# Patient Record
Sex: MALE | Race: WHITE | NOT HISPANIC OR LATINO | Employment: FULL TIME | URBAN - METROPOLITAN AREA
[De-identification: names, ages, dates, MRNs, and addresses within clinical notes are randomized per-mention and may not be internally consistent; named-entity substitution may affect disease eponyms.]

---

## 2024-02-14 ENCOUNTER — HOSPITAL ENCOUNTER (EMERGENCY)
Facility: HOSPITAL | Age: 29
Discharge: HOME | End: 2024-02-14
Attending: EMERGENCY MEDICINE | Admitting: EMERGENCY MEDICINE
Payer: COMMERCIAL

## 2024-02-14 VITALS
RESPIRATION RATE: 16 BRPM | BODY MASS INDEX: 33.08 KG/M2 | OXYGEN SATURATION: 98 % | WEIGHT: 218.26 LBS | HEART RATE: 58 BPM | TEMPERATURE: 97 F | DIASTOLIC BLOOD PRESSURE: 97 MMHG | SYSTOLIC BLOOD PRESSURE: 153 MMHG | HEIGHT: 68 IN

## 2024-02-14 DIAGNOSIS — R10.9 RIGHT SIDED ABDOMINAL PAIN: ICD-10-CM

## 2024-02-14 DIAGNOSIS — R10.9 RIGHT FLANK PAIN: Primary | ICD-10-CM

## 2024-02-14 LAB
ALBUMIN SERPL-MCNC: 4.4 G/DL (ref 3.5–5.2)
ALBUMIN/GLOB SERPL: 1.6 G/DL
ALP SERPL-CCNC: 84 U/L (ref 39–117)
ALT SERPL W P-5'-P-CCNC: 18 U/L (ref 1–41)
ANION GAP SERPL CALCULATED.3IONS-SCNC: 12.1 MMOL/L (ref 5–15)
AST SERPL-CCNC: 15 U/L (ref 1–40)
BASOPHILS # BLD AUTO: 0.05 10*3/MM3 (ref 0–0.2)
BASOPHILS NFR BLD AUTO: 0.5 % (ref 0–1.5)
BILIRUB SERPL-MCNC: 0.7 MG/DL (ref 0–1.2)
BILIRUB UR QL STRIP: NEGATIVE
BUN SERPL-MCNC: 11 MG/DL (ref 6–20)
BUN/CREAT SERPL: 12.4 (ref 7–25)
CALCIUM SPEC-SCNC: 9.1 MG/DL (ref 8.6–10.5)
CHLORIDE SERPL-SCNC: 103 MMOL/L (ref 98–107)
CLARITY UR: ABNORMAL
CO2 SERPL-SCNC: 24.9 MMOL/L (ref 22–29)
COLOR UR: YELLOW
CREAT SERPL-MCNC: 0.89 MG/DL (ref 0.76–1.27)
DEPRECATED RDW RBC AUTO: 40.5 FL (ref 37–54)
EGFRCR SERPLBLD CKD-EPI 2021: 119 ML/MIN/1.73
EOSINOPHIL # BLD AUTO: 0.09 10*3/MM3 (ref 0–0.4)
EOSINOPHIL NFR BLD AUTO: 0.9 % (ref 0.3–6.2)
ERYTHROCYTE [DISTWIDTH] IN BLOOD BY AUTOMATED COUNT: 12.9 % (ref 12.3–15.4)
GLOBULIN UR ELPH-MCNC: 2.7 GM/DL
GLUCOSE SERPL-MCNC: 91 MG/DL (ref 65–99)
GLUCOSE UR STRIP-MCNC: NEGATIVE MG/DL
HCT VFR BLD AUTO: 48.5 % (ref 37.5–51)
HGB BLD-MCNC: 16.1 G/DL (ref 13–17.7)
HGB UR QL STRIP.AUTO: NEGATIVE
HOLD SPECIMEN: NORMAL
HOLD SPECIMEN: NORMAL
IMM GRANULOCYTES # BLD AUTO: 0.04 10*3/MM3 (ref 0–0.05)
IMM GRANULOCYTES NFR BLD AUTO: 0.4 % (ref 0–0.5)
KETONES UR QL STRIP: NEGATIVE
LEUKOCYTE ESTERASE UR QL STRIP.AUTO: NEGATIVE
LIPASE SERPL-CCNC: 32 U/L (ref 13–60)
LYMPHOCYTES # BLD AUTO: 2.95 10*3/MM3 (ref 0.7–3.1)
LYMPHOCYTES NFR BLD AUTO: 28.9 % (ref 19.6–45.3)
MCH RBC QN AUTO: 29 PG (ref 26.6–33)
MCHC RBC AUTO-ENTMCNC: 33.2 G/DL (ref 31.5–35.7)
MCV RBC AUTO: 87.2 FL (ref 79–97)
MONOCYTES # BLD AUTO: 0.88 10*3/MM3 (ref 0.1–0.9)
MONOCYTES NFR BLD AUTO: 8.6 % (ref 5–12)
NEUTROPHILS NFR BLD AUTO: 6.19 10*3/MM3 (ref 1.7–7)
NEUTROPHILS NFR BLD AUTO: 60.7 % (ref 42.7–76)
NITRITE UR QL STRIP: NEGATIVE
NRBC BLD AUTO-RTO: 0 /100 WBC (ref 0–0.2)
PH UR STRIP.AUTO: 6.5 [PH] (ref 5–8)
PLATELET # BLD AUTO: 280 10*3/MM3 (ref 140–450)
PMV BLD AUTO: 9.7 FL (ref 6–12)
POTASSIUM SERPL-SCNC: 3.7 MMOL/L (ref 3.5–5.2)
PROT SERPL-MCNC: 7.1 G/DL (ref 6–8.5)
PROT UR QL STRIP: NEGATIVE
RBC # BLD AUTO: 5.56 10*6/MM3 (ref 4.14–5.8)
SODIUM SERPL-SCNC: 140 MMOL/L (ref 136–145)
SP GR UR STRIP: 1.02 (ref 1–1.03)
UROBILINOGEN UR QL STRIP: ABNORMAL
WBC NRBC COR # BLD AUTO: 10.2 10*3/MM3 (ref 3.4–10.8)
WHOLE BLOOD HOLD COAG: NORMAL
WHOLE BLOOD HOLD SPECIMEN: NORMAL

## 2024-02-14 PROCEDURE — 99283 EMERGENCY DEPT VISIT LOW MDM: CPT

## 2024-02-14 PROCEDURE — 36415 COLL VENOUS BLD VENIPUNCTURE: CPT

## 2024-02-14 PROCEDURE — 80053 COMPREHEN METABOLIC PANEL: CPT | Performed by: EMERGENCY MEDICINE

## 2024-02-14 PROCEDURE — 25010000002 KETOROLAC TROMETHAMINE PER 15 MG: Performed by: NURSE PRACTITIONER

## 2024-02-14 PROCEDURE — 96361 HYDRATE IV INFUSION ADD-ON: CPT

## 2024-02-14 PROCEDURE — 83690 ASSAY OF LIPASE: CPT | Performed by: EMERGENCY MEDICINE

## 2024-02-14 PROCEDURE — 25810000003 SODIUM CHLORIDE 0.9 % SOLUTION: Performed by: NURSE PRACTITIONER

## 2024-02-14 PROCEDURE — 36415 COLL VENOUS BLD VENIPUNCTURE: CPT | Performed by: EMERGENCY MEDICINE

## 2024-02-14 PROCEDURE — 85025 COMPLETE CBC W/AUTO DIFF WBC: CPT | Performed by: EMERGENCY MEDICINE

## 2024-02-14 PROCEDURE — 96374 THER/PROPH/DIAG INJ IV PUSH: CPT

## 2024-02-14 PROCEDURE — 81003 URINALYSIS AUTO W/O SCOPE: CPT | Performed by: EMERGENCY MEDICINE

## 2024-02-14 RX ORDER — SODIUM CHLORIDE 0.9 % (FLUSH) 0.9 %
10 SYRINGE (ML) INJECTION AS NEEDED
Status: DISCONTINUED | OUTPATIENT
Start: 2024-02-14 | End: 2024-02-15 | Stop reason: HOSPADM

## 2024-02-14 RX ORDER — IBUPROFEN 600 MG/1
600 TABLET ORAL EVERY 8 HOURS PRN
Qty: 20 TABLET | Refills: 0 | Status: SHIPPED | OUTPATIENT
Start: 2024-02-14

## 2024-02-14 RX ORDER — KETOROLAC TROMETHAMINE 15 MG/ML
15 INJECTION, SOLUTION INTRAMUSCULAR; INTRAVENOUS ONCE
Status: COMPLETED | OUTPATIENT
Start: 2024-02-14 | End: 2024-02-14

## 2024-02-14 RX ADMIN — KETOROLAC TROMETHAMINE 15 MG: 15 INJECTION, SOLUTION INTRAMUSCULAR; INTRAVENOUS at 21:36

## 2024-02-14 RX ADMIN — SODIUM CHLORIDE 1000 ML: 9 INJECTION, SOLUTION INTRAVENOUS at 21:38

## 2024-02-15 NOTE — ED PROVIDER NOTES
Pt presents to the ED c/o right-sided abdominal and flank pain for the last 1 to 2 days.  Described as an ache.  Was evaluated at [facility] emergency department earlier today, had normal labs and imaging studies and was discharged home.  Came in for ongoing symptoms.  No fevers, chills, nausea, vomiting, diarrhea, dysuria, hematuria.     On exam,   General: No acute distress, nontoxic  HEENT: Mucous membranes moist, atraumatic, EOMI  Neck: Full ROM  Pulm: Symmetric chest rise, nonlabored, lungs CTAB  Cardiovascular: Regular rate and rhythm, intact distal pulses  GI: Soft, minimal right mid upper abdominal discomfort to palpation, nondistended, no rebound, no guarding, bowel sounds present  MSK: Full ROM, no deformity  Skin: Warm, dry  Neuro: Awake, alert, oriented x 4, GCS 15, moving all extremities, no focal deficits  Psych: Calm, cooperative        Plan: Given recent normal workup earlier today, lower concern for acute emergent process of cholecystitis, appendicitis, obstructive uropathy, suspect probably more likely muscular in nature but considerations for super early infectious process or inflammatory process are considered.  Will recheck labs, will hold off on CT scan unless labs are abnormal.  Supportive care.    ED Course as of 02/15/24 0209   Wed Feb 14, 2024 2144 WBC: 10.20 [DC]   2144 Hemoglobin: 16.1 [DC]   2144 Glucose: 91 [DC]   2144 BUN: 11 [DC]   2144 Creatinine: 0.89 [DC]   2144 Sodium: 140 [DC]   2144 Potassium: 3.7 [DC]   2144 Lipase: 32 [DC]   2144 Nitrite, UA: Negative [DC]   2144 Leukocytes, UA: Negative [DC]   2144 Ketones, UA: Negative [DC]   2144 Blood, UA: Negative [DC]   2200 I discussed the case with Dr. Alba and they agree to evaluate the patient at the bedside.    [AR]   2218 The patient was reexamined.  They have had symptomatic improvement during their ED stay.  I discussed today's findings with the patient, explaining the pertinent positives and negatives from today's visit,  and the plan of care.  Discussed plan for discharge as there is no emergent indication for admission.  Discussed limitation of the ED work-up and that this is to rule out life-threatening emergencies but that they could require further testing as determined by their primary care and or any referred specialist patient is agreeable and understands need for follow-up and repeat exam/testing.  Patient is aware that discharge does not mean there is nothing wrong, indicates no emergency is present, and that they must continue their care with their primary care physician and/or any referred specialist.  They were given appropriate follow-up with their primary care physician and/or specialist.  I had an extensive discussion on the expected clinical course and return precautions.  Patient understands to return to the emergency department for continuation, worsening, or new symptoms.  I answered any of the patient's questions. Patient was discharged home in a stable condition.     [AR]      ED Course User Index  [AR] Chen Stark APRN  [DC] Raj Alba MD       Reassuring workup at this point, reassuring benign exam, safe for discharge with outpatient supportive care, ED return precautions given.  All questions and concerns addressed.     MD Attestation Note    SHARED VISIT: This visit was performed by BOTH a physician and an APC. The substantive portion of the medical decision making was performed by this attesting physician who made or approved the management plan and takes responsibility for patient management. All studies in the APC note (if performed) were independently interpreted by me.                   Raj Alba MD  02/15/24 0211

## 2024-02-15 NOTE — DISCHARGE INSTRUCTIONS
You have been given emergency department evaluation.  This evaluation is intended to rule out life-threatening conditions.  Is not a complete evaluation.  You could require further testing as determined by your primary care physician or any referred specialist.  Please follow-up with all doctors that you are referred to.  Rest.  Drink plenty of fluids, stay hydrated.  You can take Tylenol in addition to ibuprofen you were prescribed.  Please be sure to take your prescribed medications and follow any specific instructions in the discharge instructions.  Please follow-up with your primary care physician within 48 hours.  Please have your primary care provider recheck your blood pressure.  Please return to the emergency department if you experience chest pain, shortness of breath, abdominal pain, fever greater than 102, intractable vomiting.  Please return to the emergency department if your symptoms continue or worsen, or if you begin to experience any other concerning symptom.

## 2024-02-15 NOTE — ED PROVIDER NOTES
EMERGENCY DEPARTMENT ENCOUNTER  Room Number:  05/05  PCP: Provider, No Known  Independent Historians: Patient      HPI:  Chief Complaint: had concerns including Abdominal Pain (right lower pain radiating to lower back, right side only).     A complete HPI/ROS/PMH/PSH/SH/FH are unobtainable due to: None    Chronic or social conditions impacting patient care (Social Determinants of Health): None      Context: Syed Chowdhury is a 29 y.o. male who presents to the emergency department complaining of right-sided abdominal pain and right flank pain.  Symptoms started 1 to 2 days ago and have been constant.  Pain is described as an ache.  Patient is unaware of any alleviating or aggravating factors.  Patient reports he was evaluated at [facility] emergency department prior to arrival at this emergency department, states he was told his CAT scan was normal and to go home and take Tylenol and ibuprofen.  Patient denies fever, chills, headache, lightheadedness, dizziness, numbness, tingling, unilateral extremity weakness, syncope, shortness of breath, chest pain, nausea, vomiting, diarrhea, dysuria, hematuria, or other complaints.    Review of prior external notes (non-ED) -and- Review of prior external test results outside of this encounter:   Very 14 2024: [facility] emergency department visit.  Patient was evaluated for right-sided flank pain.  CT abdomen and pelvis unremarkable.      PAST MEDICAL HISTORY  Active Ambulatory Problems     Diagnosis Date Noted    No Active Ambulatory Problems     Resolved Ambulatory Problems     Diagnosis Date Noted    No Resolved Ambulatory Problems     No Additional Past Medical History         PAST SURGICAL HISTORY  No past surgical history on file.      FAMILY HISTORY  Family History   Problem Relation Age of Onset    No Known Problems Mother     No Known Problems Father          SOCIAL HISTORY  Social History     Socioeconomic History    Marital status: Single   Tobacco Use     Smoking status: Never    Smokeless tobacco: Never   Substance and Sexual Activity    Alcohol use: Yes     Comment: occ    Drug use: No    Sexual activity: Defer         ALLERGIES  Patient has no known allergies.      REVIEW OF SYSTEMS  Included in HPI  All systems reviewed and negative except for those discussed in HPI.      PHYSICAL EXAM    I have reviewed the triage vital signs and nursing notes.    ED Triage Vitals   Temp Heart Rate Resp BP SpO2   02/14/24 2041 02/14/24 2041 02/14/24 2041 02/14/24 2047 02/14/24 2041   97 °F (36.1 °C) 84 16 161/96 98 %      Temp src Heart Rate Source Patient Position BP Location FiO2 (%)   -- -- 02/14/24 2047 02/14/24 2047 --     Lying Left arm        Physical Exam  Vitals and nursing note reviewed.   Constitutional:       General: He is awake. He is not in acute distress.     Appearance: He is not toxic-appearing.   HENT:      Head: Normocephalic and atraumatic.   Eyes:      General: Lids are normal. Vision grossly intact.   Cardiovascular:      Rate and Rhythm: Normal rate and regular rhythm.      Heart sounds: Normal heart sounds.   Pulmonary:      Effort: Pulmonary effort is normal.      Breath sounds: Normal breath sounds and air entry.   Abdominal:      General: Bowel sounds are normal.      Palpations: Abdomen is soft.      Tenderness: There is no abdominal tenderness (mild). There is right CVA tenderness (mild ttp, pain is reproducible.).   Musculoskeletal:      Cervical back: Normal range of motion.   Skin:     General: Skin is warm and dry.      Coloration: Skin is not pale.   Neurological:      General: No focal deficit present.      Mental Status: He is alert and oriented to person, place, and time.      GCS: GCS eye subscore is 4. GCS verbal subscore is 5. GCS motor subscore is 6.   Psychiatric:         Attention and Perception: Attention normal.         Mood and Affect: Mood normal.         Speech: Speech normal.         Behavior: Behavior is cooperative.         LAB  RESULTS  Recent Results (from the past 24 hour(s))   LIPASE    Collection Time: 02/14/24  8:14 AM    Specimen: Fresh Frozen Plasma    Specimen type and source: Plasma, Blood   Result Value Ref Range    Lipase 14 0 - 59 U/L   CBC AND DIFFERENTIAL    Collection Time: 02/14/24  8:14 AM    Specimen type and source: Whole Blood, Blood   Result Value Ref Range    WBC 8.80 4.5 - 11.0 10*3/uL    RBC 5.75 4.5 - 5.9 10*6/uL    Hemoglobin 16.8 13.5 - 17.5 g/dL    Hematocrit 49.9 41.0 - 53.0 %    MCV 86.8 80.0 - 100.0 fL    MCH 29.2 26.0 - 34.0 pg    MCHC 33.7 31.0 - 37.0 g/dL    RDW 12.7 12.0 - 16.8 %    Platelets 250 140 - 440 10*3/uL    MPV 9.7 8.4 - 12.4 fL    Differential Type Hospital CBC w/AutoDiff (arb'U)    Neutrophil Rel % 66.1 45 - 80 %    Lymphocyte Rel % 25.1 15 - 50 %    Monocyte Rel % 6.8 0 - 15 %    Eosinophil % 0.6 0 - 7 %    Basophil Rel % 0.6 0 - 2 %    Immature Grans % 0.8 0.0 - 1.0 %    nRBC 0 0 /100(WBC)    Neutrophils Absolute 5.82 2.0 - 8.8 10*3/uL    Lymphocytes Absolute 2.21 0.7 - 5.5 10*3/uL    Monocytes Absolute 0.60 0.0 - 1.7 10*3/uL    Eosinophils Absolute 0.05 0.0 - 0.8 10*3/uL    Basophils Absolute 0.05 0.0 - 0.2 10*3/uL    Immature Grans, Absolute 0.07 0.00 - 0.10 10*3/uL   Comprehensive Metabolic Panel    Collection Time: 02/14/24  8:48 PM    Specimen: Blood   Result Value Ref Range    Glucose 91 65 - 99 mg/dL    BUN 11 6 - 20 mg/dL    Creatinine 0.89 0.76 - 1.27 mg/dL    Sodium 140 136 - 145 mmol/L    Potassium 3.7 3.5 - 5.2 mmol/L    Chloride 103 98 - 107 mmol/L    CO2 24.9 22.0 - 29.0 mmol/L    Calcium 9.1 8.6 - 10.5 mg/dL    Total Protein 7.1 6.0 - 8.5 g/dL    Albumin 4.4 3.5 - 5.2 g/dL    ALT (SGPT) 18 1 - 41 U/L    AST (SGOT) 15 1 - 40 U/L    Alkaline Phosphatase 84 39 - 117 U/L    Total Bilirubin 0.7 0.0 - 1.2 mg/dL    Globulin 2.7 gm/dL    A/G Ratio 1.6 g/dL    BUN/Creatinine Ratio 12.4 7.0 - 25.0    Anion Gap 12.1 5.0 - 15.0 mmol/L    eGFR 119.0 >60.0 mL/min/1.73   Lipase    Collection  Time: 02/14/24  8:48 PM    Specimen: Blood   Result Value Ref Range    Lipase 32 13 - 60 U/L   Green Top (Gel)    Collection Time: 02/14/24  8:48 PM   Result Value Ref Range    Extra Tube Hold for add-ons.    Lavender Top    Collection Time: 02/14/24  8:48 PM   Result Value Ref Range    Extra Tube hold for add-on    Gold Top - SST    Collection Time: 02/14/24  8:48 PM   Result Value Ref Range    Extra Tube Hold for add-ons.    Light Blue Top    Collection Time: 02/14/24  8:48 PM   Result Value Ref Range    Extra Tube Hold for add-ons.    CBC Auto Differential    Collection Time: 02/14/24  8:48 PM    Specimen: Blood   Result Value Ref Range    WBC 10.20 3.40 - 10.80 10*3/mm3    RBC 5.56 4.14 - 5.80 10*6/mm3    Hemoglobin 16.1 13.0 - 17.7 g/dL    Hematocrit 48.5 37.5 - 51.0 %    MCV 87.2 79.0 - 97.0 fL    MCH 29.0 26.6 - 33.0 pg    MCHC 33.2 31.5 - 35.7 g/dL    RDW 12.9 12.3 - 15.4 %    RDW-SD 40.5 37.0 - 54.0 fl    MPV 9.7 6.0 - 12.0 fL    Platelets 280 140 - 450 10*3/mm3    Neutrophil % 60.7 42.7 - 76.0 %    Lymphocyte % 28.9 19.6 - 45.3 %    Monocyte % 8.6 5.0 - 12.0 %    Eosinophil % 0.9 0.3 - 6.2 %    Basophil % 0.5 0.0 - 1.5 %    Immature Grans % 0.4 0.0 - 0.5 %    Neutrophils, Absolute 6.19 1.70 - 7.00 10*3/mm3    Lymphocytes, Absolute 2.95 0.70 - 3.10 10*3/mm3    Monocytes, Absolute 0.88 0.10 - 0.90 10*3/mm3    Eosinophils, Absolute 0.09 0.00 - 0.40 10*3/mm3    Basophils, Absolute 0.05 0.00 - 0.20 10*3/mm3    Immature Grans, Absolute 0.04 0.00 - 0.05 10*3/mm3    nRBC 0.0 0.0 - 0.2 /100 WBC   Urinalysis With Microscopic If Indicated (No Culture) - Urine, Clean Catch    Collection Time: 02/14/24  8:55 PM    Specimen: Urine, Clean Catch   Result Value Ref Range    Color, UA Yellow Yellow, Straw    Appearance, UA Turbid (A) Clear    pH, UA 6.5 5.0 - 8.0    Specific Gravity, UA 1.020 1.005 - 1.030    Glucose, UA Negative Negative    Ketones, UA Negative Negative    Bilirubin, UA Negative Negative    Blood, UA  Negative Negative    Protein, UA Negative Negative    Leuk Esterase, UA Negative Negative    Nitrite, UA Negative Negative    Urobilinogen, UA 2.0 E.U./dL (A) 0.2 - 1.0 E.U./dL         MEDICATIONS GIVEN IN ER  Medications   sodium chloride 0.9 % flush 10 mL (has no administration in time range)   sodium chloride 0.9 % bolus 1,000 mL (1,000 mL Intravenous New Bag 2/14/24 2138)   ketorolac (TORADOL) injection 15 mg (15 mg Intravenous Given 2/14/24 2136)           OUTPATIENT MEDICATION MANAGEMENT:  Current Facility-Administered Medications Ordered in Epic   Medication Dose Route Frequency Provider Last Rate Last Admin    sodium chloride 0.9 % bolus 1,000 mL  1,000 mL Intravenous Once Chen Stark APRN 1,000 mL/hr at 02/14/24 2138 1,000 mL at 02/14/24 2138    sodium chloride 0.9 % flush 10 mL  10 mL Intravenous PRN Raj Alba MD         Current Outpatient Medications Ordered in Epic   Medication Sig Dispense Refill    ibuprofen (ADVIL,MOTRIN) 600 MG tablet Take 1 tablet by mouth Every 8 (Eight) Hours As Needed for Mild Pain. 20 tablet 0             PROGRESS, DATA ANALYSIS, CONSULTS, AND MEDICAL DECISION MAKING  ORDERS PLACED DURING THIS VISIT:  Orders Placed This Encounter   Procedures    [place] Draw    Comprehensive Metabolic Panel    Lipase    Urinalysis With Microscopic If Indicated (No Culture) - Urine, Clean Catch    CBC Auto Differential    NPO Diet NPO Type: Strict NPO    Undress & Gown    Insert Peripheral IV    CBC & Differential    Green Top (Gel)    Lavender Top    Gold Top - SST    Light Blue Top       All labs have been independently interpreted by me.  All radiology studies have been reviewed by me. All EKG's have been independently viewed and interpreted by me.  Discussion below represents my analysis of pertinent findings related to patient's condition, differential diagnosis, treatment plan and final disposition.    Differential diagnosis includes but is not limited to:   Ureteral stone, acute  appendicitis, muscle strain, etc.    ED Course:  ED Course as of 02/14/24 2220 Wed Feb 14, 2024 2144 WBC: 10.20 [DC]   2144 Hemoglobin: 16.1 [DC]   2144 Glucose: 91 [DC]   2144 BUN: 11 [DC]   2144 Creatinine: 0.89 [DC]   2144 Sodium: 140 [DC]   2144 Potassium: 3.7 [DC]   2144 Lipase: 32 [DC]   2144 Nitrite, UA: Negative [DC]   2144 Leukocytes, UA: Negative [DC]   2144 Ketones, UA: Negative [DC]   2144 Blood, UA: Negative [DC]   2200 I discussed the case with Dr. Alba and they agree to evaluate the patient at the bedside.    [AR]   2218 The patient was reexamined.  They have had symptomatic improvement during their ED stay.  I discussed today's findings with the patient, explaining the pertinent positives and negatives from today's visit, and the plan of care.  Discussed plan for discharge as there is no emergent indication for admission.  Discussed limitation of the ED work-up and that this is to rule out life-threatening emergencies but that they could require further testing as determined by their primary care and or any referred specialist patient is agreeable and understands need for follow-up and repeat exam/testing.  Patient is aware that discharge does not mean there is nothing wrong, indicates no emergency is present, and that they must continue their care with their primary care physician and/or any referred specialist.  They were given appropriate follow-up with their primary care physician and/or specialist.  I had an extensive discussion on the expected clinical course and return precautions.  Patient understands to return to the emergency department for continuation, worsening, or new symptoms.  I answered any of the patient's questions. Patient was discharged home in a stable condition.     [AR]      ED Course User Index  [AR] Chen Stark APRN  [DC] Raj Abla MD           AS OF 22:24 EST VITALS:    BP - 153/97  HR - 58  TEMP - 97 °F (36.1 °C)  O2 SATS - 98%      MDM:  Patient is a overall  healthy 29-year-old male who presents to the emergency department complaining of right-sided abdominal pain and right flank pain.  Patient was evaluated at [facility] emergency department prior to this ED visit where he underwent labs and a CT scan of abdomen and pelvis.  I reviewed the previous encounter, CT scan abdomen pelvis was completely unremarkable.  Labs are unremarkable here, no tenderness in the right lower quadrant therefore unlikely appendicitis.  Urinalysis was without hematuria therefore likely not ureteral stone. Vital signs stable. Will treat patient symptomatically at home with ibuprofen.  Patient will be discharged home, he is agreeable.  Strict return precautions given.      COMPLEXITY OF CARE  Admission was considered but after careful review of the patient's presentation, physical examination, diagnostic results, and response to treatment the patient may be safely discharged with outpatient follow-up.        DIAGNOSIS  Final diagnoses:   Right flank pain   Right sided abdominal pain         DISPOSITION  ED Disposition       ED Disposition   Discharge    Condition   Stable    Comment   --                      Please note that portions of this document were completed with a voice recognition program.    Note Disclaimer: At [facility], we believe that sharing information builds trust and better relationships. You are receiving this note because you recently visited [facility]. It is possible you will see health information before a provider has talked with you about it. This kind of information can be easy to misunderstand. To help you fully understand what it means for your health, we urge you to discuss this note with your provider.       Chen Stark, LESLIE  02/15/24 0532

## 2024-02-16 ENCOUNTER — HOSPITAL ENCOUNTER (EMERGENCY)
Facility: HOSPITAL | Age: 29
Discharge: HOME | End: 2024-02-16
Attending: EMERGENCY MEDICINE | Admitting: EMERGENCY MEDICINE
Payer: COMMERCIAL

## 2024-02-16 ENCOUNTER — APPOINTMENT (OUTPATIENT)
Dept: CT IMAGING | Facility: HOSPITAL | Age: 29
End: 2024-02-16
Payer: COMMERCIAL

## 2024-02-16 VITALS
TEMPERATURE: 97.9 F | SYSTOLIC BLOOD PRESSURE: 156 MMHG | BODY MASS INDEX: 33.04 KG/M2 | OXYGEN SATURATION: 96 % | DIASTOLIC BLOOD PRESSURE: 101 MMHG | HEIGHT: 68 IN | RESPIRATION RATE: 18 BRPM | WEIGHT: 218 LBS | HEART RATE: 55 BPM

## 2024-02-16 DIAGNOSIS — N13.30 HYDRONEPHROSIS OF RIGHT KIDNEY: ICD-10-CM

## 2024-02-16 DIAGNOSIS — R10.9 ACUTE RIGHT FLANK PAIN: Primary | ICD-10-CM

## 2024-02-16 DIAGNOSIS — N20.0 KIDNEY STONE: ICD-10-CM

## 2024-02-16 LAB
ALBUMIN SERPL-MCNC: 4.7 G/DL (ref 3.5–5.2)
ALBUMIN/GLOB SERPL: 1.9 G/DL
ALP SERPL-CCNC: 82 U/L (ref 39–117)
ALT SERPL W P-5'-P-CCNC: 18 U/L (ref 1–41)
ANION GAP SERPL CALCULATED.3IONS-SCNC: 12 MMOL/L (ref 5–15)
AST SERPL-CCNC: 17 U/L (ref 1–40)
BASOPHILS # BLD AUTO: 0.04 10*3/MM3 (ref 0–0.2)
BASOPHILS NFR BLD AUTO: 0.4 % (ref 0–1.5)
BILIRUB SERPL-MCNC: 0.7 MG/DL (ref 0–1.2)
BILIRUB UR QL STRIP: NEGATIVE
BUN SERPL-MCNC: 13 MG/DL (ref 6–20)
BUN/CREAT SERPL: 14.3 (ref 7–25)
CALCIUM SPEC-SCNC: 9.5 MG/DL (ref 8.6–10.5)
CHLORIDE SERPL-SCNC: 103 MMOL/L (ref 98–107)
CK SERPL-CCNC: 77 U/L (ref 20–200)
CLARITY UR: ABNORMAL
CO2 SERPL-SCNC: 23 MMOL/L (ref 22–29)
COLOR UR: YELLOW
CREAT SERPL-MCNC: 0.91 MG/DL (ref 0.76–1.27)
DEPRECATED RDW RBC AUTO: 40.7 FL (ref 37–54)
EGFRCR SERPLBLD CKD-EPI 2021: 117 ML/MIN/1.73
EOSINOPHIL # BLD AUTO: 0.03 10*3/MM3 (ref 0–0.4)
EOSINOPHIL NFR BLD AUTO: 0.3 % (ref 0.3–6.2)
ERYTHROCYTE [DISTWIDTH] IN BLOOD BY AUTOMATED COUNT: 12.8 % (ref 12.3–15.4)
GLOBULIN UR ELPH-MCNC: 2.5 GM/DL
GLUCOSE SERPL-MCNC: 111 MG/DL (ref 65–99)
GLUCOSE UR STRIP-MCNC: NEGATIVE MG/DL
HCT VFR BLD AUTO: 49.3 % (ref 37.5–51)
HGB BLD-MCNC: 16.5 G/DL (ref 13–17.7)
HGB UR QL STRIP.AUTO: NEGATIVE
IMM GRANULOCYTES # BLD AUTO: 0.04 10*3/MM3 (ref 0–0.05)
IMM GRANULOCYTES NFR BLD AUTO: 0.4 % (ref 0–0.5)
KETONES UR QL STRIP: NEGATIVE
LEUKOCYTE ESTERASE UR QL STRIP.AUTO: NEGATIVE
LIPASE SERPL-CCNC: 21 U/L (ref 13–60)
LYMPHOCYTES # BLD AUTO: 1.83 10*3/MM3 (ref 0.7–3.1)
LYMPHOCYTES NFR BLD AUTO: 17.3 % (ref 19.6–45.3)
MCH RBC QN AUTO: 29.4 PG (ref 26.6–33)
MCHC RBC AUTO-ENTMCNC: 33.5 G/DL (ref 31.5–35.7)
MCV RBC AUTO: 87.9 FL (ref 79–97)
MONOCYTES # BLD AUTO: 0.72 10*3/MM3 (ref 0.1–0.9)
MONOCYTES NFR BLD AUTO: 6.8 % (ref 5–12)
NEUTROPHILS NFR BLD AUTO: 7.89 10*3/MM3 (ref 1.7–7)
NEUTROPHILS NFR BLD AUTO: 74.8 % (ref 42.7–76)
NITRITE UR QL STRIP: NEGATIVE
NRBC BLD AUTO-RTO: 0 /100 WBC (ref 0–0.2)
PH UR STRIP.AUTO: 7.5 [PH] (ref 5–8)
PLATELET # BLD AUTO: 256 10*3/MM3 (ref 140–450)
PMV BLD AUTO: 9.7 FL (ref 6–12)
POTASSIUM SERPL-SCNC: 4.2 MMOL/L (ref 3.5–5.2)
PROT SERPL-MCNC: 7.2 G/DL (ref 6–8.5)
PROT UR QL STRIP: NEGATIVE
RBC # BLD AUTO: 5.61 10*6/MM3 (ref 4.14–5.8)
SODIUM SERPL-SCNC: 138 MMOL/L (ref 136–145)
SP GR UR STRIP: 1.01 (ref 1–1.03)
UROBILINOGEN UR QL STRIP: ABNORMAL
WBC NRBC COR # BLD AUTO: 10.55 10*3/MM3 (ref 3.4–10.8)

## 2024-02-16 PROCEDURE — 83690 ASSAY OF LIPASE: CPT | Performed by: EMERGENCY MEDICINE

## 2024-02-16 PROCEDURE — 74177 CT ABD & PELVIS W/CONTRAST: CPT

## 2024-02-16 PROCEDURE — 80053 COMPREHEN METABOLIC PANEL: CPT | Performed by: EMERGENCY MEDICINE

## 2024-02-16 PROCEDURE — 81003 URINALYSIS AUTO W/O SCOPE: CPT | Performed by: EMERGENCY MEDICINE

## 2024-02-16 PROCEDURE — 25010000002 ONDANSETRON PER 1 MG: Performed by: EMERGENCY MEDICINE

## 2024-02-16 PROCEDURE — 25510000001 IOPAMIDOL 61 % SOLUTION: Performed by: EMERGENCY MEDICINE

## 2024-02-16 PROCEDURE — 25810000003 SODIUM CHLORIDE 0.9 % SOLUTION: Performed by: EMERGENCY MEDICINE

## 2024-02-16 PROCEDURE — 99285 EMERGENCY DEPT VISIT HI MDM: CPT

## 2024-02-16 PROCEDURE — 82550 ASSAY OF CK (CPK): CPT | Performed by: EMERGENCY MEDICINE

## 2024-02-16 PROCEDURE — 96374 THER/PROPH/DIAG INJ IV PUSH: CPT

## 2024-02-16 PROCEDURE — 85025 COMPLETE CBC W/AUTO DIFF WBC: CPT | Performed by: EMERGENCY MEDICINE

## 2024-02-16 PROCEDURE — 96375 TX/PRO/DX INJ NEW DRUG ADDON: CPT

## 2024-02-16 PROCEDURE — 25010000002 HYDROMORPHONE PER 4 MG: Performed by: EMERGENCY MEDICINE

## 2024-02-16 RX ORDER — ONDANSETRON 2 MG/ML
4 INJECTION INTRAMUSCULAR; INTRAVENOUS ONCE
Status: COMPLETED | OUTPATIENT
Start: 2024-02-16 | End: 2024-02-16

## 2024-02-16 RX ORDER — ONDANSETRON 4 MG/1
4 TABLET, ORALLY DISINTEGRATING ORAL 4 TIMES DAILY PRN
Qty: 12 TABLET | Refills: 0 | Status: SHIPPED | OUTPATIENT
Start: 2024-02-16

## 2024-02-16 RX ORDER — SODIUM CHLORIDE 0.9 % (FLUSH) 0.9 %
10 SYRINGE (ML) INJECTION AS NEEDED
Status: DISCONTINUED | OUTPATIENT
Start: 2024-02-16 | End: 2024-02-16 | Stop reason: HOSPADM

## 2024-02-16 RX ORDER — TAMSULOSIN HYDROCHLORIDE 0.4 MG/1
1 CAPSULE ORAL DAILY
Qty: 5 CAPSULE | Refills: 0 | Status: SHIPPED | OUTPATIENT
Start: 2024-02-16

## 2024-02-16 RX ORDER — HYDROCODONE BITARTRATE AND ACETAMINOPHEN 5; 325 MG/1; MG/1
1 TABLET ORAL EVERY 6 HOURS PRN
Qty: 12 TABLET | Refills: 0 | Status: SHIPPED | OUTPATIENT
Start: 2024-02-16

## 2024-02-16 RX ORDER — HYDROMORPHONE HYDROCHLORIDE 1 MG/ML
0.5 INJECTION, SOLUTION INTRAMUSCULAR; INTRAVENOUS; SUBCUTANEOUS ONCE
Status: COMPLETED | OUTPATIENT
Start: 2024-02-16 | End: 2024-02-16

## 2024-02-16 RX ADMIN — HYDROMORPHONE HYDROCHLORIDE 0.5 MG: 1 INJECTION, SOLUTION INTRAMUSCULAR; INTRAVENOUS; SUBCUTANEOUS at 03:16

## 2024-02-16 RX ADMIN — IOPAMIDOL 85 ML: 612 INJECTION, SOLUTION INTRAVENOUS at 03:28

## 2024-02-16 RX ADMIN — SODIUM CHLORIDE 1000 ML: 9 INJECTION, SOLUTION INTRAVENOUS at 03:16

## 2024-02-16 RX ADMIN — ONDANSETRON 4 MG: 2 INJECTION INTRAMUSCULAR; INTRAVENOUS at 03:16

## 2024-02-16 NOTE — ED PROVIDER NOTES
EMERGENCY DEPARTMENT ENCOUNTER  Room Number:  12/12  PCP: Provider, No Known  Independent Historians: Patient and Family      HPI:  Chief Complaint: had concerns including Back Pain (Back pain and nausea).     A complete HPI/ROS/PMH/PSH/SH/FH are unobtainable due to: None    Chronic or social conditions impacting patient care (Social Determinants of Health): None      Context: Syed Chowdhury is a 29 y.o. male with a medical history of no past medical history who presents to the ED c/o acute right flank pain.  The patient reports that he has had right flank pain for the last day today and a half.  He reports it has been severe.  He reports that he has been to 2 different emergency rooms for this pain.  He states that he has had a CAT scan and labs.  He states he was discharged on ibuprofen and muscle relaxers.  He reports the pain has gotten worse.  He reports it is all in his right flank.  He reports developing nausea and vomiting just prior to arrival.  He states he has been taking his medicine without relief.  He denies any prior abdominal surgeries.  He denies any midline back pain.      Review of prior external notes (non-ED) -and- Review of prior external test results outside of this encounter:  CT of the abdomen pelvis without contrast on 2/14/2024 showed no acute process.  He did have a cyst in his right kidney.    Prescription drug monitoring program review: YOBANI reviewed by Bg Paulino MD KASPER query complete and reviewed. Treatment plan to include limited course of prescribed controlled substance. Risks including addiction, benefits, and alternatives presented to patient.    PAST MEDICAL HISTORY  Active Ambulatory Problems     Diagnosis Date Noted    No Active Ambulatory Problems     Resolved Ambulatory Problems     Diagnosis Date Noted    No Resolved Ambulatory Problems     No Additional Past Medical History         PAST SURGICAL HISTORY  No past surgical history on file.      FAMILY  HISTORY  Family History   Problem Relation Age of Onset    No Known Problems Mother     No Known Problems Father          SOCIAL HISTORY  Social History     Socioeconomic History    Marital status:    Tobacco Use    Smoking status: Never    Smokeless tobacco: Never   Substance and Sexual Activity    Alcohol use: Yes     Comment: occ    Drug use: No    Sexual activity: Defer         ALLERGIES  Patient has no known allergies.      REVIEW OF SYSTEMS  Review of Systems  Included in HPI  All systems reviewed and negative except for those discussed in HPI.      PHYSICAL EXAM    I have reviewed the triage vital signs and nursing notes.    ED Triage Vitals [02/16/24 0240]   Temp Heart Rate Resp BP SpO2   97.9 °F (36.6 °C) 70 18 -- 99 %      Temp src Heart Rate Source Patient Position BP Location FiO2 (%)   Tympanic Monitor -- -- --       Physical Exam  GENERAL: Awake, alert, appears uncomfortable  SKIN: Warm, dry  HENT: Normocephalic, atraumatic  EYES: no scleral icterus  CV: regular rhythm, regular rate  RESPIRATORY: normal effort, lungs clear  ABDOMEN: soft, nontender, nondistended.  Right CVA and right flank tenderness.  No midline thoracic or lumbar tenderness.  MUSCULOSKELETAL: no deformity  NEURO: alert, moves all extremities, follows commands            LAB RESULTS  Recent Results (from the past 24 hour(s))   Comprehensive Metabolic Panel    Collection Time: 02/16/24  3:15 AM    Specimen: Blood   Result Value Ref Range    Glucose 111 (H) 65 - 99 mg/dL    BUN 13 6 - 20 mg/dL    Creatinine 0.91 0.76 - 1.27 mg/dL    Sodium 138 136 - 145 mmol/L    Potassium 4.2 3.5 - 5.2 mmol/L    Chloride 103 98 - 107 mmol/L    CO2 23.0 22.0 - 29.0 mmol/L    Calcium 9.5 8.6 - 10.5 mg/dL    Total Protein 7.2 6.0 - 8.5 g/dL    Albumin 4.7 3.5 - 5.2 g/dL    ALT (SGPT) 18 1 - 41 U/L    AST (SGOT) 17 1 - 40 U/L    Alkaline Phosphatase 82 39 - 117 U/L    Total Bilirubin 0.7 0.0 - 1.2 mg/dL    Globulin 2.5 gm/dL    A/G Ratio 1.9 g/dL     BUN/Creatinine Ratio 14.3 7.0 - 25.0    Anion Gap 12.0 5.0 - 15.0 mmol/L    eGFR 117.0 >60.0 mL/min/1.73   Urinalysis With Microscopic If Indicated (No Culture) - Urine, Clean Catch    Collection Time: 02/16/24  3:15 AM    Specimen: Urine, Clean Catch   Result Value Ref Range    Color, UA Yellow Yellow, Straw    Appearance, UA Cloudy (A) Clear    pH, UA 7.5 5.0 - 8.0    Specific Gravity, UA 1.014 1.005 - 1.030    Glucose, UA Negative Negative    Ketones, UA Negative Negative    Bilirubin, UA Negative Negative    Blood, UA Negative Negative    Protein, UA Negative Negative    Leuk Esterase, UA Negative Negative    Nitrite, UA Negative Negative    Urobilinogen, UA 1.0 E.U./dL 0.2 - 1.0 E.U./dL   CBC Auto Differential    Collection Time: 02/16/24  3:15 AM    Specimen: Blood   Result Value Ref Range    WBC 10.55 3.40 - 10.80 10*3/mm3    RBC 5.61 4.14 - 5.80 10*6/mm3    Hemoglobin 16.5 13.0 - 17.7 g/dL    Hematocrit 49.3 37.5 - 51.0 %    MCV 87.9 79.0 - 97.0 fL    MCH 29.4 26.6 - 33.0 pg    MCHC 33.5 31.5 - 35.7 g/dL    RDW 12.8 12.3 - 15.4 %    RDW-SD 40.7 37.0 - 54.0 fl    MPV 9.7 6.0 - 12.0 fL    Platelets 256 140 - 450 10*3/mm3    Neutrophil % 74.8 42.7 - 76.0 %    Lymphocyte % 17.3 (L) 19.6 - 45.3 %    Monocyte % 6.8 5.0 - 12.0 %    Eosinophil % 0.3 0.3 - 6.2 %    Basophil % 0.4 0.0 - 1.5 %    Immature Grans % 0.4 0.0 - 0.5 %    Neutrophils, Absolute 7.89 (H) 1.70 - 7.00 10*3/mm3    Lymphocytes, Absolute 1.83 0.70 - 3.10 10*3/mm3    Monocytes, Absolute 0.72 0.10 - 0.90 10*3/mm3    Eosinophils, Absolute 0.03 0.00 - 0.40 10*3/mm3    Basophils, Absolute 0.04 0.00 - 0.20 10*3/mm3    Immature Grans, Absolute 0.04 0.00 - 0.05 10*3/mm3    nRBC 0.0 0.0 - 0.2 /100 WBC   Lipase    Collection Time: 02/16/24  3:15 AM    Specimen: Blood   Result Value Ref Range    Lipase 21 13 - 60 U/L   CK    Collection Time: 02/16/24  3:15 AM    Specimen: Blood   Result Value Ref Range    Creatine Kinase 77 20 - 200 U/L         RADIOLOGY  CT  Abdomen Pelvis With Contrast    Result Date: 2/16/2024  Patient: ANNA MARIE MARIE  Time Out: 05:42 Exam(s): CT ABDOMEN + PELVIS With Contrast EXAM:   CT Abdomen and Pelvis With Intravenous Contrast CLINICAL HISTORY:    Reason for exam: acute right flank pain. TECHNIQUE:   Axial computed tomography images of the abdomen and pelvis with intravenous contrast.  CTDI is 13.33 mGy and DLP is 773.6 mGy-cm.  This CT exam was performed according to the principle of ALARA (As Low As Reasonably Achievable) by using one or more of the following dose reduction techniques: automated exposure control, adjustment of the mA and or kV according to patient size, and or use of iterative reconstruction technique. COMPARISON:   No relevant prior studies available. FINDINGS:   Lung bases:  Unremarkable.  No mass.  No consolidation.  ABDOMEN:   Liver:  Unremarkable.  No mass.   Gallbladder and bile ducts:  Unremarkable.  No calcified stones.  No ductal dilation.   Pancreas:  Unremarkable.  No mass.  No ductal dilation.   Spleen:  Unremarkable.  No splenomegaly.   Adrenals:  Unremarkable.  No mass.   Kidneys and ureters:  There is borderline dilatation of the right renal collecting system and right renal pelvis which could represent minimal hydronephrosis.  The right ureter is otherwise unremarkable.  There is no radiopaque obstructing ureteral stone.  There are relatively well- circumscribed hypodense nodules in the right kidney which most likely represent renal cysts.  The larger is at the upper pole, measures 1.3 cm in diameter with Hounsfield units of 15.  These most likely represent renal cysts and can be further assessed with a nonemergent ultrasound as clinically warranted. The left kidney and ureter are unremarkable.   Stomach and bowel:  Unremarkable.  No obstruction.  No mucosal thickening.  PELVIS:   Appendix:  Normal appendix.   Bladder:  There is borderline wall thickening of the urinary bladder, possibly due to a urinary  tract infection.    Reproductive:  There is a punctate calcification at the base of the penis-question if this represents a punctate stone in the penile urethra- series 4, image 111, series 2, image 170 and series 3, image 81.  ABDOMEN and PELVIS:   Intraperitoneal space:  Unremarkable.  No free air.  No significant fluid collection.   Bones joints:  No acute fracture.  No dislocation.   Soft tissues:  Unremarkable.   Vasculature:  Unremarkable.  No abdominal aortic aneurysm.   Lymph nodes:  Unremarkable.  No enlarged lymph nodes. IMPRESSION:     1.  Borderline dilatation of the right renal collecting system suggestive of minimal hydronephrosis.  There is a punctate stone which could be within the proximal segment of the penile urethra as described.  No other evidence of a radiopaque urinary tract stone. 2.  Borderline bladder wall thickening-correlate clinically for any possibility of a urinary tract infection. 3.  Normal appendix.     Electronically signed by Shea Fuentes MD on 02-16-24 at 0542       MEDICATIONS GIVEN IN ER  Medications   sodium chloride 0.9 % flush 10 mL (has no administration in time range)   HYDROmorphone (DILAUDID) injection 0.5 mg (0.5 mg Intravenous Given 2/16/24 0316)   sodium chloride 0.9 % bolus 1,000 mL (1,000 mL Intravenous New Bag 2/16/24 0316)   ondansetron (ZOFRAN) injection 4 mg (4 mg Intravenous Given 2/16/24 0316)   iopamidol (ISOVUE-300) 61 % injection 100 mL (85 mL Intravenous Given 2/16/24 0328)         ORDERS PLACED DURING THIS VISIT:  Orders Placed This Encounter   Procedures    CT Abdomen Pelvis With Contrast    Comprehensive Metabolic Panel    Urinalysis With Microscopic If Indicated (No Culture) - Urine, Clean Catch    CBC Auto Differential    Lipase    CK    Insert Peripheral IV    CBC & Differential         OUTPATIENT MEDICATION MANAGEMENT:  Current Facility-Administered Medications Ordered in Epic   Medication Dose Route Frequency Provider Last Rate Last Admin    sodium  chloride 0.9 % flush 10 mL  10 mL Intravenous PRN Bg Paulino MD         Current Outpatient Medications Ordered in Epic   Medication Sig Dispense Refill    HYDROcodone-acetaminophen (NORCO) 5-325 MG per tablet Take 1 tablet by mouth Every 6 (Six) Hours As Needed for Severe Pain. 12 tablet 0    ibuprofen (ADVIL,MOTRIN) 600 MG tablet Take 1 tablet by mouth Every 8 (Eight) Hours As Needed for Mild Pain. 20 tablet 0    ondansetron ODT (ZOFRAN-ODT) 4 MG disintegrating tablet Place 1 tablet on the tongue 4 (Four) Times a Day As Needed for Nausea. 12 tablet 0    tamsulosin (FLOMAX) 0.4 MG capsule 24 hr capsule Take 1 capsule by mouth Daily. 5 capsule 0         PROCEDURES  Procedures            PROGRESS, DATA ANALYSIS, CONSULTS, AND MEDICAL DECISION MAKING  All labs have been independently interpreted by me.  All radiology studies have been reviewed by me. All EKG's have been independently viewed and interpreted by me.  Discussion below represents my analysis of pertinent findings related to patient's condition, differential diagnosis, treatment plan and final disposition.    Differential diagnosis includes but is not limited to UTI, kidney stone, muscle strain, rhabdomyolysis, shingles, appendicitis, pyelonephritis.    Clinical Scores:                   ED Course as of 02/16/24 0553   Fri Feb 16, 2024   0329 CT Abdomen Pelvis With Contrast  My independent interpretation of the imaging study is right renal cyst [TR]   0415 BP(!): 156/101 [TR]   0420 Evaluation is unremarkable.  He has normal urinalysis.  He has normal chemistries blood counts.  He has normal CK which rules out rhabdomyolysis. [TR]   0550 I reviewed the workup and findings with the patient and family at the bedside.  Answered all questions.  The patient reports he is comfortable.  He does have a small kidney stone seen in the base of the penis and some mild hydronephrosis on the right.  Plan discharge home with Flomax, pain medicine, antiemetics and  follow-up with urology.  The patient and family are agreeable. [TR]      ED Course User Index  [TR] Bg Paulino MD             AS OF 05:53 EST VITALS:    BP - (!) 156/101  HR - 59  TEMP - 97.9 °F (36.6 °C) (Tympanic)  O2 SATS - 93%    COMPLEXITY OF CARE  Admission was considered but after careful review of the patient's presentation, physical examination, diagnostic results, and response to treatment the patient may be safely discharged with outpatient follow-up.      DIAGNOSIS  Final diagnoses:   Acute right flank pain   Hydronephrosis of right kidney   Kidney stone         DISPOSITION  ED Disposition       ED Disposition   Discharge    Condition   Stable    Comment   --                Please note that portions of this document were completed with a voice recognition program.    Note Disclaimer: At [facility], we believe that sharing information builds trust and better relationships. You are receiving this note because you recently visited [facility]. It is possible you will see health information before a provider has talked with you about it. This kind of information can be easy to misunderstand. To help you fully understand what it means for your health, we urge you to discuss this note with your provider.         Bg Paulino MD  02/16/24 0552       Bg Paulino MD  02/16/24 0553

## 2024-02-16 NOTE — DISCHARGE INSTRUCTIONS
Take pain medication as prescribed.  Use nausea medicine as needed for nausea.  Call the urologist office for an appointment to be seen.  Return immediately for any uncontrolled pain, uncontrolled nausea or vomiting, or fever of 100.4.

## 2024-02-16 NOTE — ED NOTES
Pt arrived to ER via pv. C/o of lower back pain. Was seen yesterday at ER, was advised if he became nauseated to come back.  Pt stated his nausea stated at 2300

## 2024-04-02 ENCOUNTER — OFFICE VISIT (OUTPATIENT)
Dept: FAMILY MEDICINE CLINIC | Facility: CLINIC | Age: 29
End: 2024-04-02
Payer: COMMERCIAL

## 2024-04-02 VITALS
OXYGEN SATURATION: 97 % | SYSTOLIC BLOOD PRESSURE: 110 MMHG | BODY MASS INDEX: 34.22 KG/M2 | TEMPERATURE: 97.8 F | HEART RATE: 74 BPM | HEIGHT: 68 IN | DIASTOLIC BLOOD PRESSURE: 70 MMHG | WEIGHT: 225.8 LBS

## 2024-04-02 DIAGNOSIS — Z00.00 ENCOUNTER FOR PREVENTIVE HEALTH EXAMINATION: ICD-10-CM

## 2024-04-02 DIAGNOSIS — M89.8X8 STERNAL MASS: Chronic | ICD-10-CM

## 2024-04-02 DIAGNOSIS — Z77.22 TOBACCO SMOKE EXPOSURE: ICD-10-CM

## 2024-04-02 DIAGNOSIS — Z76.89 ENCOUNTER TO ESTABLISH CARE: Primary | ICD-10-CM

## 2024-04-02 DIAGNOSIS — Z87.442 HISTORY OF RENAL STONE: ICD-10-CM

## 2024-04-02 DIAGNOSIS — Z13.220 LIPID SCREENING: ICD-10-CM

## 2024-04-02 DIAGNOSIS — Z11.59 NEED FOR HEPATITIS C SCREENING TEST: ICD-10-CM

## 2024-04-02 PROCEDURE — 93000 ELECTROCARDIOGRAM COMPLETE: CPT | Performed by: STUDENT IN AN ORGANIZED HEALTH CARE EDUCATION/TRAINING PROGRAM

## 2024-04-02 PROCEDURE — 99203 OFFICE O/P NEW LOW 30 MIN: CPT | Performed by: STUDENT IN AN ORGANIZED HEALTH CARE EDUCATION/TRAINING PROGRAM

## 2024-04-02 NOTE — PROGRESS NOTES
"Chief Complaint  Establish Care (Pt has bump on chest noticed 2 yrs ago)    Subjective        Syed Chowdhury presents to [facility]  History of Present Illness  28yo WM Patient was previously NOT seen by PCP at [facility], AND patient is new to me and is here for establishment of care visit for chronic medical conditions including h/o renal stone.    Pt has h/o renal stone and states he went to ER x3 and subsequently went to [facility] and Rx flomax and renal stone passed and pt was discharged by [facility]. Pt denied any current renal stone sx states its resolved.    Pt c/o skin lump on his sternum present for 2 years and now getting bigger.  Pt denied any cancers in first degree relatives.    Denied TOB use.    Patient denied any Chest pain, Shortness of Air, palpitations, Dizziness, Headache, Blurred vision, or weakness/numbness.   Pt reports h/o 2nd hand tob exposure to parental TOB smoking as a child.  Pt does reports he wheezes occasionally after exercising.      Objective   Vital Signs:  /70 (BP Location: Left arm, Patient Position: Sitting, Cuff Size: Adult)   Pulse 74   Temp 97.8 °F (36.6 °C) (Temporal)   Ht 172.7 cm (67.99\")   Wt 102 kg (225 lb 12.8 oz)   SpO2 97%   BMI 34.34 kg/m²   Estimated body mass index is 34.34 kg/m² as calculated from the following:    Height as of this encounter: 172.7 cm (67.99\").    Weight as of this encounter: 102 kg (225 lb 12.8 oz).       BMI is >= 30 and <35. (Class 1 Obesity). The following options were offered after discussion;: exercise counseling/recommendations and nutrition counseling/recommendations      Physical Exam  Constitutional:       Appearance: Normal appearance.   HENT:      Head: Normocephalic and atraumatic.   Eyes:      Conjunctiva/sclera: Conjunctivae normal.   Cardiovascular:      Rate and Rhythm: Normal rate and regular rhythm.      Heart sounds: Normal heart sounds. "   Pulmonary:      Effort: Pulmonary effort is normal.      Breath sounds: Normal breath sounds.   Abdominal:      General: Bowel sounds are normal.      Palpations: Abdomen is soft.      Comments: Non-tender   Skin:     General: Skin is warm.      Comments: Single Lanse size lipoma like mass on right lateral lower-third of sternum.  Mass is movable and semi-solid on palpation.    No other chest masses or lesions noted on exam.       Neurological:      General: No focal deficit present.      Mental Status: He is alert and oriented to person, place, and time.   Psychiatric:         Mood and Affect: Mood normal.         Behavior: Behavior normal.        Result Review :    The following data was reviewed by: LESLIE Oates on 04/02/2024:  Common labs          2/14/2024    08:14 2/14/2024    20:48 2/16/2024    03:15   Common Labs   Glucose  91  111    BUN  11  13    Creatinine  0.89  0.91    Sodium  140  138    Potassium  3.7  4.2    Chloride  103  103    Calcium  9.1  9.5    Albumin  4.4  4.7    Total Bilirubin  0.7  0.7    Alkaline Phosphatase  84  82    AST (SGOT)  15  17    ALT (SGPT)  18  18    WBC 8.80     10.20  10.55    Hemoglobin 16.8     16.1  16.5    Hematocrit 49.9     48.5  49.3    Platelets 250     280  256       Details          This result is from an external source.                     ECG 12 Lead    Date/Time: 4/2/2024 9:51 AM  Performed by: Mert Harp APRN    Authorized by: Mert Harp APRN  Previous ECG: no previous ECG available  Rhythm: sinus rhythm  Rate: normal  QRS axis: normal    Clinical impression: normal ECG            Assessment and Plan     Diagnoses and all orders for this visit:    1. Encounter to establish care (Primary)    2. Sternal mass  -     XR Chest PA & Lateral; Future  -     US chest; Future  -     ECG 12 Lead    3. History of renal stone  Assessment & Plan:  Encourage adequate fluid intake at least 2 L of water per day.    Orders:  -      Comprehensive Metabolic Panel  -     Urinalysis With Microscopic - Urine, Clean Catch    4. Tobacco smoke exposure  Assessment & Plan:  Patient reports occasional post-exercise wheezing     Orders:  -     Spirometry - Pre & Post Bronchodilator; Future    5. Encounter for preventive health examination  Assessment & Plan:  Discussed the importance of healthy diet, nutrition, and lifestyle. Recommend low salt, fat/cholesterol diet and avoid concentrated sweets. Encouraged DASH diet along with fresh fruits & vegetables and low fat dairy products. Counseled patient to exercise/walk as tolerated. Avoid tobacco and alcohol use.      Orders:  -     CBC & Differential  -     Comprehensive Metabolic Panel    6. Lipid screening  -     Lipid Panel    7. Need for hepatitis C screening test  Assessment & Plan:  Patient consented to hepatitis lab test today after discussion.      Orders:  -     Hepatitis C Antibody      All chronic conditions have been addressed and treated by the practice or other specialists. Medications have been reconciled and refilled as appropriate. Reiterated compliance and timely follow up appointments. Side effects of all new and old medications reviewed with the patient and patient willing to accept all risks involved. Advised RTO if no improvement or worsening of symptoms or if any new complaints arise. Patient advised to follow up with clinic or call after diagnostic tests, if patient does not hear from office 3 days after the test completion.            Follow Up     Return in about 4 weeks (around 4/30/2024) for Next scheduled follow up.  Patient was given instructions and counseling regarding his condition or for health maintenance advice. Please see specific information pulled into the AVS if appropriate.

## 2024-04-03 LAB
ALBUMIN SERPL-MCNC: 4.8 G/DL (ref 3.5–5.2)
ALBUMIN/GLOB SERPL: 1.9 G/DL
ALP SERPL-CCNC: 94 U/L (ref 39–117)
ALT SERPL-CCNC: 23 U/L (ref 1–41)
APPEARANCE UR: CLEAR
AST SERPL-CCNC: 19 U/L (ref 1–40)
BACTERIA #/AREA URNS HPF: NORMAL /HPF
BASOPHILS # BLD AUTO: 0.07 10*3/MM3 (ref 0–0.2)
BASOPHILS NFR BLD AUTO: 0.9 % (ref 0–1.5)
BILIRUB SERPL-MCNC: 0.7 MG/DL (ref 0–1.2)
BILIRUB UR QL STRIP: NEGATIVE
BUN SERPL-MCNC: 13 MG/DL (ref 6–20)
BUN/CREAT SERPL: 13 (ref 7–25)
CALCIUM SERPL-MCNC: 9.8 MG/DL (ref 8.6–10.5)
CASTS URNS MICRO: NORMAL
CHLORIDE SERPL-SCNC: 103 MMOL/L (ref 98–107)
CHOLEST SERPL-MCNC: 210 MG/DL (ref 0–200)
CO2 SERPL-SCNC: 24.9 MMOL/L (ref 22–29)
COLOR UR: YELLOW
CREAT SERPL-MCNC: 1 MG/DL (ref 0.76–1.27)
EGFRCR SERPLBLD CKD-EPI 2021: 104.5 ML/MIN/1.73
EOSINOPHIL # BLD AUTO: 0.15 10*3/MM3 (ref 0–0.4)
EOSINOPHIL NFR BLD AUTO: 2 % (ref 0.3–6.2)
EPI CELLS #/AREA URNS HPF: NORMAL /HPF
ERYTHROCYTE [DISTWIDTH] IN BLOOD BY AUTOMATED COUNT: 12.8 % (ref 12.3–15.4)
GLOBULIN SER CALC-MCNC: 2.5 GM/DL
GLUCOSE SERPL-MCNC: 95 MG/DL (ref 65–99)
GLUCOSE UR QL STRIP: NEGATIVE
HCT VFR BLD AUTO: 47.9 % (ref 37.5–51)
HCV IGG SERPL QL IA: NON REACTIVE
HDLC SERPL-MCNC: 63 MG/DL (ref 40–60)
HGB BLD-MCNC: 16.5 G/DL (ref 13–17.7)
HGB UR QL STRIP: NEGATIVE
IMM GRANULOCYTES # BLD AUTO: 0.1 10*3/MM3 (ref 0–0.05)
IMM GRANULOCYTES NFR BLD AUTO: 1.3 % (ref 0–0.5)
KETONES UR QL STRIP: NEGATIVE
LDLC SERPL CALC-MCNC: 135 MG/DL (ref 0–100)
LEUKOCYTE ESTERASE UR QL STRIP: NEGATIVE
LYMPHOCYTES # BLD AUTO: 2.6 10*3/MM3 (ref 0.7–3.1)
LYMPHOCYTES NFR BLD AUTO: 33.8 % (ref 19.6–45.3)
MCH RBC QN AUTO: 29.9 PG (ref 26.6–33)
MCHC RBC AUTO-ENTMCNC: 34.4 G/DL (ref 31.5–35.7)
MCV RBC AUTO: 86.8 FL (ref 79–97)
MONOCYTES # BLD AUTO: 0.56 10*3/MM3 (ref 0.1–0.9)
MONOCYTES NFR BLD AUTO: 7.3 % (ref 5–12)
NEUTROPHILS # BLD AUTO: 4.21 10*3/MM3 (ref 1.7–7)
NEUTROPHILS NFR BLD AUTO: 54.7 % (ref 42.7–76)
NITRITE UR QL STRIP: NEGATIVE
NRBC BLD AUTO-RTO: 0 /100 WBC (ref 0–0.2)
PH UR STRIP: 7.5 [PH] (ref 5–8)
PLATELET # BLD AUTO: 281 10*3/MM3 (ref 140–450)
POTASSIUM SERPL-SCNC: 4.7 MMOL/L (ref 3.5–5.2)
PROT SERPL-MCNC: 7.3 G/DL (ref 6–8.5)
PROT UR QL STRIP: NEGATIVE
RBC # BLD AUTO: 5.52 10*6/MM3 (ref 4.14–5.8)
RBC #/AREA URNS HPF: NORMAL /HPF
SODIUM SERPL-SCNC: 141 MMOL/L (ref 136–145)
SP GR UR STRIP: 1.02 (ref 1–1.03)
TRIGL SERPL-MCNC: 70 MG/DL (ref 0–150)
UROBILINOGEN UR STRIP-MCNC: NORMAL MG/DL
VLDLC SERPL CALC-MCNC: 12 MG/DL (ref 5–40)
WBC # BLD AUTO: 7.69 10*3/MM3 (ref 3.4–10.8)
WBC #/AREA URNS HPF: NORMAL /HPF

## 2024-04-10 ENCOUNTER — HOSPITAL ENCOUNTER (OUTPATIENT)
Dept: RESPIRATORY THERAPY | Facility: HOSPITAL | Age: 29
Discharge: HOME | End: 2024-04-10
Admitting: STUDENT IN AN ORGANIZED HEALTH CARE EDUCATION/TRAINING PROGRAM
Payer: COMMERCIAL

## 2024-04-10 DIAGNOSIS — Z77.22 TOBACCO SMOKE EXPOSURE: ICD-10-CM

## 2024-04-10 PROCEDURE — 94060 EVALUATION OF WHEEZING: CPT

## 2024-04-10 PROCEDURE — 94640 AIRWAY INHALATION TREATMENT: CPT

## 2024-04-10 RX ORDER — ALBUTEROL SULFATE 2.5 MG/3ML
2.5 SOLUTION RESPIRATORY (INHALATION) ONCE
Status: COMPLETED | OUTPATIENT
Start: 2024-04-10 | End: 2024-04-10

## 2024-04-10 RX ADMIN — ALBUTEROL SULFATE 2.5 MG: 2.5 SOLUTION RESPIRATORY (INHALATION) at 10:23

## 2024-04-12 ENCOUNTER — HOSPITAL ENCOUNTER (OUTPATIENT)
Dept: GENERAL RADIOLOGY | Facility: HOSPITAL | Age: 29
Discharge: HOME | End: 2024-04-12
Payer: COMMERCIAL

## 2024-04-12 ENCOUNTER — TELEPHONE (OUTPATIENT)
Dept: FAMILY MEDICINE CLINIC | Facility: CLINIC | Age: 29
End: 2024-04-12
Payer: COMMERCIAL

## 2024-04-12 ENCOUNTER — HOSPITAL ENCOUNTER (OUTPATIENT)
Dept: ULTRASOUND IMAGING | Facility: HOSPITAL | Age: 29
Discharge: HOME | End: 2024-04-12
Payer: COMMERCIAL

## 2024-04-12 DIAGNOSIS — M89.8X8 STERNAL MASS: Chronic | ICD-10-CM

## 2024-04-12 PROCEDURE — 76604 US EXAM CHEST: CPT

## 2024-04-12 PROCEDURE — 71046 X-RAY EXAM CHEST 2 VIEWS: CPT

## 2024-04-12 NOTE — TELEPHONE ENCOUNTER
Caller: Syed Chowdhury    Relationship: Self    Best call back number: [phone]     What is the best time to reach you: ANYTIME    Who are you requesting to speak with (clinical staff, provider,  specific staff member): NICK GOMEZ      What was the call regarding: PATIENT WOULD LIKE A CALL TO GO OVER HIS MOST RECENT LAB RESULTS.

## 2024-04-15 DIAGNOSIS — M89.8X8 STERNAL MASS: Primary | Chronic | ICD-10-CM

## 2024-04-17 ENCOUNTER — TELEPHONE (OUTPATIENT)
Dept: SURGERY | Facility: CLINIC | Age: 29
End: 2024-04-17
Payer: COMMERCIAL

## 2024-05-01 ENCOUNTER — OFFICE VISIT (OUTPATIENT)
Dept: SURGERY | Facility: CLINIC | Age: 29
End: 2024-05-01
Payer: COMMERCIAL

## 2024-05-01 ENCOUNTER — PREP FOR SURGERY (OUTPATIENT)
Dept: OTHER | Facility: HOSPITAL | Age: 29
End: 2024-05-01
Payer: COMMERCIAL

## 2024-05-01 VITALS
DIASTOLIC BLOOD PRESSURE: 86 MMHG | BODY MASS INDEX: 34.1 KG/M2 | SYSTOLIC BLOOD PRESSURE: 130 MMHG | WEIGHT: 225 LBS | HEIGHT: 68 IN

## 2024-05-01 DIAGNOSIS — R22.2 CHEST WALL MASS: Primary | ICD-10-CM

## 2024-05-01 PROCEDURE — 99204 OFFICE O/P NEW MOD 45 MIN: CPT | Performed by: STUDENT IN AN ORGANIZED HEALTH CARE EDUCATION/TRAINING PROGRAM

## 2024-05-01 RX ORDER — ACETAMINOPHEN 500 MG
1000 TABLET ORAL ONCE
OUTPATIENT
Start: 2024-05-01 | End: 2024-05-01

## 2024-05-01 RX ORDER — GABAPENTIN 300 MG/1
600 CAPSULE ORAL ONCE
OUTPATIENT
Start: 2024-05-01 | End: 2024-05-01

## 2024-05-01 RX ORDER — CELECOXIB 200 MG/1
200 CAPSULE ORAL ONCE
OUTPATIENT
Start: 2024-05-01 | End: 2024-05-01

## 2024-05-01 RX ORDER — HEPARIN SODIUM 5000 [USP'U]/ML
5000 INJECTION, SOLUTION INTRAVENOUS; SUBCUTANEOUS ONCE
OUTPATIENT
Start: 2024-05-01

## 2024-05-01 NOTE — PROGRESS NOTES
"Chief Complaint  Anterior chest wall mass    Subjective        Syed Chowdhury presents to [facility]  History of Present Illness  Mr. Chowdhury is a pleasant 29-year-old man who presents today for an anterior chest wall mass evaluation.  He states that this mass has slowly grown in size over the last several months.  It is causing him some discomfort and he would like it evaluated.  He denies any personal history of cancer.  He denies any other masses in the extremity.  Objective   Vital Signs:  /86 (BP Location: Left arm, Patient Position: Sitting, Cuff Size: Adult)   Ht 172.7 cm (67.99\")   Wt 102 kg (225 lb)   BMI 34.22 kg/m²   Estimated body mass index is 34.22 kg/m² as calculated from the following:    Height as of this encounter: 172.7 cm (67.99\").    Weight as of this encounter: 102 kg (225 lb).               Physical Exam  Vitals reviewed.   Cardiovascular:      Rate and Rhythm: Normal rate.      Pulses: Normal pulses.   Pulmonary:      Effort: Pulmonary effort is normal.   Skin:     Comments: Anterior chest wall mass is mobile and soft and spongy.  The consistency on physical exam is of a lipoma.   Neurological:      Mental Status: He is alert.        Result Review :  X-ray on 4/12/2024 and ultrasound on 4/12/2024 was visualized and reviewed by myself.             Assessment and Plan     Diagnoses and all orders for this visit:    1. Chest wall mass (Primary)    Mr. Chowdhury is a pleasant 29-year-old gentleman who is here for evaluation of a anterior right-sided chest wall mass.  On physical exam and ultrasound this appears to be a lipoma.  He is scheduled to undergo a CT scan of the chest next week which was ordered by his primary care physician.  He does state that it bothers him some and is slightly uncomfortable, I think this is amenable to surgical resection.  We will place preoperative orders for surgical excision of this mass.  This is outpatient surgery " and likely he will go home the same day.  Preoperative orders have been placed.       I spent 45 minutes caring for Syed on this date of service. This time includes time spent by me in the following activities:preparing for the visit, reviewing tests, obtaining and/or reviewing a separately obtained history, performing a medically appropriate examination and/or evaluation , counseling and educating the patient/family/caregiver, ordering medications, tests, or procedures, referring and communicating with other health care professionals , documenting information in the medical record, independently interpreting results and communicating that information with the patient/family/caregiver, and care coordination  Follow Up     No follow-ups on file.  Patient was given instructions and counseling regarding his condition or for health maintenance advice. Please see specific information pulled into the AVS if appropriate.

## 2024-05-01 NOTE — H&P (VIEW-ONLY)
"Chief Complaint  Anterior chest wall mass    Subjective        Syed Chowdhury presents to [facility] THORACIC SURGERY  History of Present Illness  Mr. Chowdhury is a pleasant 29-year-old man who presents today for an anterior chest wall mass evaluation.  He states that this mass has slowly grown in size over the last several months.  It is causing him some discomfort and he would like it evaluated.  He denies any personal history of cancer.  He denies any other masses in the extremity.  Objective   Vital Signs:  /86 (BP Location: Left arm, Patient Position: Sitting, Cuff Size: Adult)   Ht 172.7 cm (67.99\")   Wt 102 kg (225 lb)   BMI 34.22 kg/m²   Estimated body mass index is 34.22 kg/m² as calculated from the following:    Height as of this encounter: 172.7 cm (67.99\").    Weight as of this encounter: 102 kg (225 lb).               Physical Exam  Vitals reviewed.   Cardiovascular:      Rate and Rhythm: Normal rate.      Pulses: Normal pulses.   Pulmonary:      Effort: Pulmonary effort is normal.   Skin:     Comments: Anterior chest wall mass is mobile and soft and spongy.  The consistency on physical exam is of a lipoma.   Neurological:      Mental Status: He is alert.        Result Review :  X-ray on 4/12/2024 and ultrasound on 4/12/2024 was visualized and reviewed by myself.             Assessment and Plan     Diagnoses and all orders for this visit:    1. Chest wall mass (Primary)    Mr. Chowdhury is a pleasant 29-year-old gentleman who is here for evaluation of a anterior right-sided chest wall mass.  On physical exam and ultrasound this appears to be a lipoma.  He is scheduled to undergo a CT scan of the chest next week which was ordered by his primary care physician.  He does state that it bothers him some and is slightly uncomfortable, I think this is amenable to surgical resection.  We will place preoperative orders for surgical excision of this mass.  This is outpatient surgery " and likely he will go home the same day.  Preoperative orders have been placed.       I spent 45 minutes caring for Syed on this date of service. This time includes time spent by me in the following activities:preparing for the visit, reviewing tests, obtaining and/or reviewing a separately obtained history, performing a medically appropriate examination and/or evaluation , counseling and educating the patient/family/caregiver, ordering medications, tests, or procedures, referring and communicating with other health care professionals , documenting information in the medical record, independently interpreting results and communicating that information with the patient/family/caregiver, and care coordination  Follow Up     No follow-ups on file.  Patient was given instructions and counseling regarding his condition or for health maintenance advice. Please see specific information pulled into the AVS if appropriate.

## 2024-05-02 ENCOUNTER — TELEPHONE (OUTPATIENT)
Dept: SURGERY | Facility: CLINIC | Age: 29
End: 2024-05-02
Payer: COMMERCIAL

## 2024-05-02 PROBLEM — R22.2 CHEST WALL MASS: Status: ACTIVE | Noted: 2024-05-01

## 2024-05-02 NOTE — TELEPHONE ENCOUNTER
Called and informed patient that his arrival time has changed to 5:30AM. Patient verbalized understanding.

## 2024-05-02 NOTE — TELEPHONE ENCOUNTER
Called and left message for patient to discuss his upcoming procedure on 5/20/24 arriving at 6:30AM. To please give me call back and if patient could leave a good email to send his paperwork to.    OKAY FOR HUB TO READ.

## 2024-05-08 ENCOUNTER — HOSPITAL ENCOUNTER (OUTPATIENT)
Dept: CT IMAGING | Facility: HOSPITAL | Age: 29
Discharge: HOME | End: 2024-05-08
Admitting: STUDENT IN AN ORGANIZED HEALTH CARE EDUCATION/TRAINING PROGRAM
Payer: COMMERCIAL

## 2024-05-08 DIAGNOSIS — M89.8X8 STERNAL MASS: Chronic | ICD-10-CM

## 2024-05-08 PROCEDURE — 25510000001 IOPAMIDOL 61 % SOLUTION: Performed by: STUDENT IN AN ORGANIZED HEALTH CARE EDUCATION/TRAINING PROGRAM

## 2024-05-08 PROCEDURE — 71260 CT THORAX DX C+: CPT

## 2024-05-08 RX ADMIN — IOPAMIDOL 75 ML: 612 INJECTION, SOLUTION INTRAVENOUS at 17:13

## 2024-05-13 ENCOUNTER — TELEPHONE (OUTPATIENT)
Dept: OTHER | Facility: HOSPITAL | Age: 29
End: 2024-05-13
Payer: COMMERCIAL

## 2024-05-13 ENCOUNTER — TELEPHONE (OUTPATIENT)
Dept: SURGERY | Facility: CLINIC | Age: 29
End: 2024-05-13

## 2024-05-13 NOTE — TELEPHONE ENCOUNTER
Left message, Dr. Morocho has spoke with patient over the phone with results leading in surgery. Dr. Morocho said wasn't really necessary for patient to come in on wednesday. Left our call back number and cancelled patient appt.

## 2024-05-13 NOTE — TELEPHONE ENCOUNTER
Caller: Syed Chowdhury    Relationship: Self    Best call back number: [phone]         Who are you requesting to speak with (clinical staff, provider,  specific staff member): SURGERY SCHEDULING     What was the call regarding: PATIENT WOULD LIKE TO GO OVER SURGERY DATE/TIME.     Is it okay if the provider responds through MyChart: PATIENT WOULD LIKE A CALL BACK.

## 2024-05-15 ENCOUNTER — PRE-ADMISSION TESTING (OUTPATIENT)
Dept: PREADMISSION TESTING | Facility: HOSPITAL | Age: 29
End: 2024-05-15
Payer: COMMERCIAL

## 2024-05-15 VITALS
RESPIRATION RATE: 16 BRPM | DIASTOLIC BLOOD PRESSURE: 83 MMHG | WEIGHT: 231.4 LBS | TEMPERATURE: 98.6 F | HEART RATE: 67 BPM | SYSTOLIC BLOOD PRESSURE: 124 MMHG | BODY MASS INDEX: 35.07 KG/M2 | HEIGHT: 68 IN | OXYGEN SATURATION: 99 %

## 2024-05-15 DIAGNOSIS — R22.2 CHEST WALL MASS: ICD-10-CM

## 2024-05-15 LAB
ABO GROUP BLD: NORMAL
ANION GAP SERPL CALCULATED.3IONS-SCNC: 11.6 MMOL/L (ref 5–15)
BLD GP AB SCN SERPL QL: NEGATIVE
BUN SERPL-MCNC: 18 MG/DL (ref 6–20)
BUN/CREAT SERPL: 22 (ref 7–25)
CALCIUM SPEC-SCNC: 9 MG/DL (ref 8.6–10.5)
CHLORIDE SERPL-SCNC: 107 MMOL/L (ref 98–107)
CO2 SERPL-SCNC: 24.4 MMOL/L (ref 22–29)
CREAT SERPL-MCNC: 0.82 MG/DL (ref 0.76–1.27)
DEPRECATED RDW RBC AUTO: 37.2 FL (ref 37–54)
EGFRCR SERPLBLD CKD-EPI 2021: 121.9 ML/MIN/1.73
ERYTHROCYTE [DISTWIDTH] IN BLOOD BY AUTOMATED COUNT: 12.1 % (ref 12.3–15.4)
GLUCOSE SERPL-MCNC: 103 MG/DL (ref 65–99)
HCT VFR BLD AUTO: 47.2 % (ref 37.5–51)
HGB BLD-MCNC: 16.4 G/DL (ref 13–17.7)
MCH RBC QN AUTO: 29.7 PG (ref 26.6–33)
MCHC RBC AUTO-ENTMCNC: 34.7 G/DL (ref 31.5–35.7)
MCV RBC AUTO: 85.4 FL (ref 79–97)
PLATELET # BLD AUTO: 257 10*3/MM3 (ref 140–450)
PMV BLD AUTO: 9.5 FL (ref 6–12)
POTASSIUM SERPL-SCNC: 4.3 MMOL/L (ref 3.5–5.2)
RBC # BLD AUTO: 5.53 10*6/MM3 (ref 4.14–5.8)
RH BLD: POSITIVE
SODIUM SERPL-SCNC: 143 MMOL/L (ref 136–145)
T&S EXPIRATION DATE: NORMAL
WBC NRBC COR # BLD AUTO: 8.61 10*3/MM3 (ref 3.4–10.8)

## 2024-05-15 PROCEDURE — 85027 COMPLETE CBC AUTOMATED: CPT

## 2024-05-15 PROCEDURE — 86850 RBC ANTIBODY SCREEN: CPT | Performed by: STUDENT IN AN ORGANIZED HEALTH CARE EDUCATION/TRAINING PROGRAM

## 2024-05-15 PROCEDURE — 86900 BLOOD TYPING SEROLOGIC ABO: CPT | Performed by: STUDENT IN AN ORGANIZED HEALTH CARE EDUCATION/TRAINING PROGRAM

## 2024-05-15 PROCEDURE — 80048 BASIC METABOLIC PNL TOTAL CA: CPT

## 2024-05-15 PROCEDURE — 36415 COLL VENOUS BLD VENIPUNCTURE: CPT

## 2024-05-15 PROCEDURE — 86901 BLOOD TYPING SEROLOGIC RH(D): CPT | Performed by: STUDENT IN AN ORGANIZED HEALTH CARE EDUCATION/TRAINING PROGRAM

## 2024-05-15 NOTE — DISCHARGE INSTRUCTIONS
Take the following medications the morning of surgery: NONE      If you are on prescription narcotic pain medication to control your pain you may also take that medication the morning of surgery.    General Instructions:  Do not eat solid food after midnight the night before surgery.  You may drink clear liquids day of surgery but must stop at least one hour before your hospital arrival time.  It is beneficial for you to have a clear drink that contains carbohydrates the day of surgery.  We suggest a 12 to 20 ounce bottle of Gatorade or Powerade for non-diabetic patients or a 12 to 20 ounce bottle of G2 or Powerade Zero for diabetic patients.     Clear liquids are liquids you can see through.  Nothing red in color.     Plain water                               Sports drinks  Sodas                                   Gelatin (Jell-O)  Fruit juices without pulp such as white grape juice and apple juice  Popsicles that contain no fruit or yogurt  Tea or coffee (no cream or milk added)  Gatorade / Powerade  G2 / Powerade Zero      Bring any papers given to you in the doctor’s office.  Wear clean comfortable clothes.  Do not wear contact lenses, false eyelashes or make-up.  Bring a case for your glasses.   Bring crutches or walker if applicable.  Remove all piercings.  Leave jewelry and any other valuables at home.  The Pre-Admission Testing nurse will instruct you to bring medications if unable to obtain an accurate list in Pre-Admission Testing.            Preventing a Surgical Site Infection:  For 2 to 3 days before surgery, avoid shaving with a razor because the razor can irritate skin and make it easier to develop an infection.    Any areas of open skin can increase the risk of a post-operative wound infection by allowing bacteria to enter and travel throughout the body.  Notify your surgeon if you have any skin wounds / rashes even if it is not near the expected surgical site.  The area will need assessed to  determine if surgery should be delayed until it is healed.  The night prior to surgery shower using a fresh bar of anti-bacterial soap (such as Dial) and clean washcloth.  Sleep in a clean bed with clean clothing.  Do not allow pets to sleep with you.  Shower on the morning of surgery using a fresh bar of anti-bacterial soap (such as Dial) and clean washcloth.  Dry with a clean towel and dress in clean clothing.  Ask your surgeon if you will be receiving antibiotics prior to surgery.  Make sure you, your family, and all healthcare providers clean their hands with soap and water or an alcohol based hand  before caring for you or your wound.    Day of surgery:  Your arrival time is approximately two hours before your scheduled surgery time.  Upon arrival, a Pre-op nurse and Anesthesiologist will review your health history, obtain vital signs, and answer questions you may have.  The only belongings needed at this time will be a list of your home medications and if applicable your C-PAP/BI-PAP machine.  A Pre-op nurse will start an IV and you may receive medication in preparation for surgery, including something to help you relax.     Please be aware that surgery does come with discomfort.  We want to make every effort to control your discomfort so please discuss any uncontrolled symptoms with your nurse.   Your doctor will most likely have prescribed pain medications.      If you are going home after surgery you will receive individualized written care instructions before being discharged.  A responsible adult must drive you to and from the hospital on the day of your surgery and ideally stay with you through the night.   .  Discharge prescriptions can be filled by the hospital pharmacy during regular pharmacy hours.  If you are having surgery late in the day/evening your prescription may be e-prescribed to your pharmacy.  Please verify your pharmacy hours or chose a 24 hour pharmacy to avoid not having access  to your prescription because your pharmacy has closed for the day.    If you are staying overnight following surgery, you will be transported to your hospital room following the recovery period.  [facility] has all private rooms.    If you have any questions please call Pre-Admission Testing at [phone].  Deductibles and co-payments are collected on the day of service. Please be prepared to pay the required co-pay, deductible or deposit on the day of service as defined by your plan.      CHLORHEXIDINE CLOTH INSTRUCTIONS  The morning of surgery follow these instructions using the Chlorhexidine cloths you've been given.  These steps reduce bacteria on the body.  Do not use the cloths near your eyes, ears mouth, genitalia or on open wounds.  Throw the cloths away after use but do not try to flush them down a toilet.      Open and remove one cloth at a time from the package.    Leave the cloth unfolded and begin the bathing.  Massage the skin with the cloths using gentle pressure to remove bacteria.  Do not scrub harshly.   Follow the steps below with one 2% CHG cloth per area (6 total cloths).  One cloth for neck, shoulders and chest.  One cloth for both arms, hands, fingers and underarms (do underarms last).  One cloth for the abdomen followed by groin.  One cloth for right leg and foot including between the toes.  One cloth for left leg and foot including between the toes.  The last cloth is to be used for the back of the neck, back and buttocks.    Allow the CHG to air dry 3 minutes on the skin which will give it time to work and decrease the chance of irritation.  The skin may feel sticky until it is dry.  Do not rinse with water or any other liquid or you will lose the beneficial effects of the CHG.  If mild skin irritation occurs, do rinse the skin to remove the CHG.  Report this to the nurse at time of admission.  Do not apply lotions, creams, ointments, deodorants or perfumes after using the  clothes. Dress in clean clothes before coming to the hospital.    Call your surgeon immediately if you experience any of the following symptoms:  Sore Throat  Shortness of Breath or difficulty breathing  Cough  Chills  Body soreness or muscle pain  Headache  Fever  New loss of taste or smell  Do not arrive for your surgery ill.  Your procedure will need to be rescheduled to another time.  You will need to call your physician before the day of surgery to avoid any unnecessary exposure to hospital staff as well as other patients.

## 2024-05-19 ENCOUNTER — ANESTHESIA EVENT (OUTPATIENT)
Dept: PERIOP | Facility: HOSPITAL | Age: 29
End: 2024-05-19
Payer: COMMERCIAL

## 2024-05-20 ENCOUNTER — ANESTHESIA (OUTPATIENT)
Dept: PERIOP | Facility: HOSPITAL | Age: 29
End: 2024-05-20
Payer: COMMERCIAL

## 2024-05-20 ENCOUNTER — HOSPITAL ENCOUNTER (OUTPATIENT)
Facility: HOSPITAL | Age: 29
Setting detail: HOSPITAL OUTPATIENT SURGERY
Discharge: HOME | End: 2024-05-20
Attending: STUDENT IN AN ORGANIZED HEALTH CARE EDUCATION/TRAINING PROGRAM | Admitting: STUDENT IN AN ORGANIZED HEALTH CARE EDUCATION/TRAINING PROGRAM
Payer: COMMERCIAL

## 2024-05-20 VITALS
HEART RATE: 70 BPM | SYSTOLIC BLOOD PRESSURE: 144 MMHG | RESPIRATION RATE: 16 BRPM | DIASTOLIC BLOOD PRESSURE: 98 MMHG | OXYGEN SATURATION: 97 % | TEMPERATURE: 98 F

## 2024-05-20 DIAGNOSIS — R22.2 CHEST WALL MASS: ICD-10-CM

## 2024-05-20 PROCEDURE — 25010000002 FENTANYL CITRATE (PF) 50 MCG/ML SOLUTION: Performed by: ANESTHESIOLOGY

## 2024-05-20 PROCEDURE — 25010000002 MIDAZOLAM PER 1 MG: Performed by: ANESTHESIOLOGY

## 2024-05-20 PROCEDURE — 25010000002 LIDOCAINE 1 % SOLUTION: Performed by: STUDENT IN AN ORGANIZED HEALTH CARE EDUCATION/TRAINING PROGRAM

## 2024-05-20 PROCEDURE — 25010000002 SUGAMMADEX 200 MG/2ML SOLUTION: Performed by: ANESTHESIOLOGY

## 2024-05-20 PROCEDURE — 25010000002 PROPOFOL 200 MG/20ML EMULSION: Performed by: ANESTHESIOLOGY

## 2024-05-20 PROCEDURE — 25810000003 LACTATED RINGERS PER 1000 ML: Performed by: ANESTHESIOLOGY

## 2024-05-20 PROCEDURE — 88304 TISSUE EXAM BY PATHOLOGIST: CPT | Performed by: STUDENT IN AN ORGANIZED HEALTH CARE EDUCATION/TRAINING PROGRAM

## 2024-05-20 PROCEDURE — 25010000002 DEXAMETHASONE SODIUM PHOSPHATE 20 MG/5ML SOLUTION: Performed by: ANESTHESIOLOGY

## 2024-05-20 PROCEDURE — 25010000002 CEFOXITIN PER 1 G: Performed by: STUDENT IN AN ORGANIZED HEALTH CARE EDUCATION/TRAINING PROGRAM

## 2024-05-20 PROCEDURE — 25010000002 HEPARIN (PORCINE) PER 1000 UNITS: Performed by: STUDENT IN AN ORGANIZED HEALTH CARE EDUCATION/TRAINING PROGRAM

## 2024-05-20 PROCEDURE — 25010000002 ONDANSETRON PER 1 MG: Performed by: ANESTHESIOLOGY

## 2024-05-20 RX ORDER — SODIUM CHLORIDE 0.9 % (FLUSH) 0.9 %
10 SYRINGE (ML) INJECTION EVERY 12 HOURS SCHEDULED
Status: DISCONTINUED | OUTPATIENT
Start: 2024-05-20 | End: 2024-05-20 | Stop reason: HOSPADM

## 2024-05-20 RX ORDER — HYDROMORPHONE HYDROCHLORIDE 1 MG/ML
0.5 INJECTION, SOLUTION INTRAMUSCULAR; INTRAVENOUS; SUBCUTANEOUS
Status: DISCONTINUED | OUTPATIENT
Start: 2024-05-20 | End: 2024-05-20 | Stop reason: HOSPADM

## 2024-05-20 RX ORDER — LIDOCAINE HYDROCHLORIDE 20 MG/ML
INJECTION, SOLUTION INFILTRATION; PERINEURAL AS NEEDED
Status: DISCONTINUED | OUTPATIENT
Start: 2024-05-20 | End: 2024-05-20 | Stop reason: SURG

## 2024-05-20 RX ORDER — CELECOXIB 200 MG/1
200 CAPSULE ORAL ONCE
Status: COMPLETED | OUTPATIENT
Start: 2024-05-20 | End: 2024-05-20

## 2024-05-20 RX ORDER — MIDAZOLAM HYDROCHLORIDE 1 MG/ML
1 INJECTION INTRAMUSCULAR; INTRAVENOUS
Status: DISCONTINUED | OUTPATIENT
Start: 2024-05-20 | End: 2024-05-20 | Stop reason: HOSPADM

## 2024-05-20 RX ORDER — ACETAMINOPHEN 500 MG
1000 TABLET ORAL EVERY 8 HOURS
Qty: 90 TABLET | Refills: 0 | Status: SHIPPED | OUTPATIENT
Start: 2024-05-20 | End: 2024-06-04

## 2024-05-20 RX ORDER — DROPERIDOL 2.5 MG/ML
0.62 INJECTION, SOLUTION INTRAMUSCULAR; INTRAVENOUS
Status: DISCONTINUED | OUTPATIENT
Start: 2024-05-20 | End: 2024-05-20 | Stop reason: HOSPADM

## 2024-05-20 RX ORDER — DEXAMETHASONE SODIUM PHOSPHATE 4 MG/ML
INJECTION, SOLUTION INTRA-ARTICULAR; INTRALESIONAL; INTRAMUSCULAR; INTRAVENOUS; SOFT TISSUE AS NEEDED
Status: DISCONTINUED | OUTPATIENT
Start: 2024-05-20 | End: 2024-05-20 | Stop reason: SURG

## 2024-05-20 RX ORDER — PROPOFOL 10 MG/ML
INJECTION, EMULSION INTRAVENOUS AS NEEDED
Status: DISCONTINUED | OUTPATIENT
Start: 2024-05-20 | End: 2024-05-20 | Stop reason: SURG

## 2024-05-20 RX ORDER — HYDRALAZINE HYDROCHLORIDE 20 MG/ML
5 INJECTION INTRAMUSCULAR; INTRAVENOUS
Status: DISCONTINUED | OUTPATIENT
Start: 2024-05-20 | End: 2024-05-20 | Stop reason: HOSPADM

## 2024-05-20 RX ORDER — EPHEDRINE SULFATE 50 MG/ML
5 INJECTION, SOLUTION INTRAVENOUS ONCE AS NEEDED
Status: DISCONTINUED | OUTPATIENT
Start: 2024-05-20 | End: 2024-05-20 | Stop reason: HOSPADM

## 2024-05-20 RX ORDER — FENTANYL CITRATE 50 UG/ML
INJECTION, SOLUTION INTRAMUSCULAR; INTRAVENOUS AS NEEDED
Status: DISCONTINUED | OUTPATIENT
Start: 2024-05-20 | End: 2024-05-20 | Stop reason: SURG

## 2024-05-20 RX ORDER — PROMETHAZINE HYDROCHLORIDE 25 MG/1
25 SUPPOSITORY RECTAL ONCE AS NEEDED
Status: DISCONTINUED | OUTPATIENT
Start: 2024-05-20 | End: 2024-05-20 | Stop reason: HOSPADM

## 2024-05-20 RX ORDER — NALOXONE HCL 0.4 MG/ML
0.2 VIAL (ML) INJECTION AS NEEDED
Status: DISCONTINUED | OUTPATIENT
Start: 2024-05-20 | End: 2024-05-20 | Stop reason: HOSPADM

## 2024-05-20 RX ORDER — HYDROCODONE BITARTRATE AND ACETAMINOPHEN 5; 325 MG/1; MG/1
1 TABLET ORAL ONCE AS NEEDED
Status: DISCONTINUED | OUTPATIENT
Start: 2024-05-20 | End: 2024-05-20 | Stop reason: HOSPADM

## 2024-05-20 RX ORDER — SODIUM CHLORIDE, SODIUM LACTATE, POTASSIUM CHLORIDE, CALCIUM CHLORIDE 600; 310; 30; 20 MG/100ML; MG/100ML; MG/100ML; MG/100ML
9 INJECTION, SOLUTION INTRAVENOUS CONTINUOUS PRN
Status: DISCONTINUED | OUTPATIENT
Start: 2024-05-20 | End: 2024-05-20 | Stop reason: HOSPADM

## 2024-05-20 RX ORDER — ROCURONIUM BROMIDE 10 MG/ML
INJECTION, SOLUTION INTRAVENOUS AS NEEDED
Status: DISCONTINUED | OUTPATIENT
Start: 2024-05-20 | End: 2024-05-20 | Stop reason: SURG

## 2024-05-20 RX ORDER — IPRATROPIUM BROMIDE AND ALBUTEROL SULFATE 2.5; .5 MG/3ML; MG/3ML
3 SOLUTION RESPIRATORY (INHALATION) ONCE AS NEEDED
Status: DISCONTINUED | OUTPATIENT
Start: 2024-05-20 | End: 2024-05-20 | Stop reason: HOSPADM

## 2024-05-20 RX ORDER — ACETAMINOPHEN 500 MG
1000 TABLET ORAL ONCE
Status: COMPLETED | OUTPATIENT
Start: 2024-05-20 | End: 2024-05-20

## 2024-05-20 RX ORDER — SODIUM CHLORIDE 9 MG/ML
40 INJECTION, SOLUTION INTRAVENOUS AS NEEDED
Status: DISCONTINUED | OUTPATIENT
Start: 2024-05-20 | End: 2024-05-20 | Stop reason: HOSPADM

## 2024-05-20 RX ORDER — LIDOCAINE HYDROCHLORIDE 10 MG/ML
INJECTION, SOLUTION INFILTRATION; PERINEURAL AS NEEDED
Status: DISCONTINUED | OUTPATIENT
Start: 2024-05-20 | End: 2024-05-20 | Stop reason: HOSPADM

## 2024-05-20 RX ORDER — SODIUM CHLORIDE 0.9 % (FLUSH) 0.9 %
10 SYRINGE (ML) INJECTION AS NEEDED
Status: DISCONTINUED | OUTPATIENT
Start: 2024-05-20 | End: 2024-05-20 | Stop reason: HOSPADM

## 2024-05-20 RX ORDER — FENTANYL CITRATE 50 UG/ML
50 INJECTION, SOLUTION INTRAMUSCULAR; INTRAVENOUS
Status: DISCONTINUED | OUTPATIENT
Start: 2024-05-20 | End: 2024-05-20 | Stop reason: HOSPADM

## 2024-05-20 RX ORDER — HEPARIN SODIUM 5000 [USP'U]/ML
5000 INJECTION, SOLUTION INTRAVENOUS; SUBCUTANEOUS ONCE
Status: COMPLETED | OUTPATIENT
Start: 2024-05-20 | End: 2024-05-20

## 2024-05-20 RX ORDER — LABETALOL HYDROCHLORIDE 5 MG/ML
5 INJECTION, SOLUTION INTRAVENOUS
Status: DISCONTINUED | OUTPATIENT
Start: 2024-05-20 | End: 2024-05-20 | Stop reason: HOSPADM

## 2024-05-20 RX ORDER — PROMETHAZINE HYDROCHLORIDE 25 MG/1
25 TABLET ORAL ONCE AS NEEDED
Status: DISCONTINUED | OUTPATIENT
Start: 2024-05-20 | End: 2024-05-20 | Stop reason: HOSPADM

## 2024-05-20 RX ORDER — ONDANSETRON 2 MG/ML
INJECTION INTRAMUSCULAR; INTRAVENOUS AS NEEDED
Status: DISCONTINUED | OUTPATIENT
Start: 2024-05-20 | End: 2024-05-20 | Stop reason: SURG

## 2024-05-20 RX ORDER — ONDANSETRON 2 MG/ML
4 INJECTION INTRAMUSCULAR; INTRAVENOUS ONCE AS NEEDED
Status: DISCONTINUED | OUTPATIENT
Start: 2024-05-20 | End: 2024-05-20 | Stop reason: HOSPADM

## 2024-05-20 RX ORDER — DIPHENHYDRAMINE HYDROCHLORIDE 50 MG/ML
12.5 INJECTION INTRAMUSCULAR; INTRAVENOUS
Status: DISCONTINUED | OUTPATIENT
Start: 2024-05-20 | End: 2024-05-20 | Stop reason: HOSPADM

## 2024-05-20 RX ORDER — OXYCODONE HYDROCHLORIDE 5 MG/1
5 TABLET ORAL EVERY 4 HOURS PRN
Qty: 15 TABLET | Refills: 0 | Status: SHIPPED | OUTPATIENT
Start: 2024-05-20

## 2024-05-20 RX ORDER — FENTANYL CITRATE 50 UG/ML
INJECTION, SOLUTION INTRAMUSCULAR; INTRAVENOUS AS NEEDED
Status: DISCONTINUED | OUTPATIENT
Start: 2024-05-20 | End: 2024-05-20

## 2024-05-20 RX ORDER — GABAPENTIN 300 MG/1
600 CAPSULE ORAL ONCE
Status: COMPLETED | OUTPATIENT
Start: 2024-05-20 | End: 2024-05-20

## 2024-05-20 RX ORDER — FLUMAZENIL 0.1 MG/ML
0.2 INJECTION INTRAVENOUS AS NEEDED
Status: DISCONTINUED | OUTPATIENT
Start: 2024-05-20 | End: 2024-05-20 | Stop reason: HOSPADM

## 2024-05-20 RX ORDER — SODIUM CHLORIDE, SODIUM LACTATE, POTASSIUM CHLORIDE, CALCIUM CHLORIDE 600; 310; 30; 20 MG/100ML; MG/100ML; MG/100ML; MG/100ML
INJECTION, SOLUTION INTRAVENOUS CONTINUOUS PRN
Status: DISCONTINUED | OUTPATIENT
Start: 2024-05-20 | End: 2024-05-20 | Stop reason: SURG

## 2024-05-20 RX ORDER — MAGNESIUM HYDROXIDE 1200 MG/15ML
LIQUID ORAL AS NEEDED
Status: DISCONTINUED | OUTPATIENT
Start: 2024-05-20 | End: 2024-05-20 | Stop reason: HOSPADM

## 2024-05-20 RX ORDER — OXYCODONE AND ACETAMINOPHEN 7.5; 325 MG/1; MG/1
1 TABLET ORAL EVERY 4 HOURS PRN
Status: DISCONTINUED | OUTPATIENT
Start: 2024-05-20 | End: 2024-05-20 | Stop reason: HOSPADM

## 2024-05-20 RX ADMIN — PROPOFOL 50 MG: 10 INJECTION, EMULSION INTRAVENOUS at 07:37

## 2024-05-20 RX ADMIN — FENTANYL CITRATE 50 MCG: 50 INJECTION, SOLUTION INTRAMUSCULAR; INTRAVENOUS at 07:45

## 2024-05-20 RX ADMIN — OXYCODONE HYDROCHLORIDE AND ACETAMINOPHEN 1 TABLET: 7.5; 325 TABLET ORAL at 09:11

## 2024-05-20 RX ADMIN — CELECOXIB 200 MG: 200 CAPSULE ORAL at 06:13

## 2024-05-20 RX ADMIN — GABAPENTIN 600 MG: 300 CAPSULE ORAL at 06:13

## 2024-05-20 RX ADMIN — DEXAMETHASONE SODIUM PHOSPHATE 10 MG: 4 INJECTION, SOLUTION INTRAMUSCULAR; INTRAVENOUS at 07:45

## 2024-05-20 RX ADMIN — SODIUM CHLORIDE, POTASSIUM CHLORIDE, SODIUM LACTATE AND CALCIUM CHLORIDE 9 ML/HR: 600; 310; 30; 20 INJECTION, SOLUTION INTRAVENOUS at 07:13

## 2024-05-20 RX ADMIN — FENTANYL CITRATE 50 MCG: 50 INJECTION, SOLUTION INTRAMUSCULAR; INTRAVENOUS at 07:37

## 2024-05-20 RX ADMIN — ROCURONIUM BROMIDE 50 MG: 10 INJECTION, SOLUTION INTRAVENOUS at 07:37

## 2024-05-20 RX ADMIN — LIDOCAINE HYDROCHLORIDE 100 MG: 20 INJECTION, SOLUTION INFILTRATION; PERINEURAL at 07:31

## 2024-05-20 RX ADMIN — PROPOFOL 250 MG: 10 INJECTION, EMULSION INTRAVENOUS at 07:32

## 2024-05-20 RX ADMIN — PROPOFOL 50 MG: 10 INJECTION, EMULSION INTRAVENOUS at 07:34

## 2024-05-20 RX ADMIN — MIDAZOLAM 1 MG: 1 INJECTION INTRAMUSCULAR; INTRAVENOUS at 06:42

## 2024-05-20 RX ADMIN — ONDANSETRON 4 MG: 2 INJECTION INTRAMUSCULAR; INTRAVENOUS at 07:56

## 2024-05-20 RX ADMIN — CEFOXITIN SODIUM 2000 MG: 2 POWDER, FOR SOLUTION INTRAVENOUS at 07:19

## 2024-05-20 RX ADMIN — SUGAMMADEX 400 MG: 100 INJECTION, SOLUTION INTRAVENOUS at 08:01

## 2024-05-20 RX ADMIN — HEPARIN SODIUM 5000 UNITS: 5000 INJECTION INTRAVENOUS; SUBCUTANEOUS at 07:13

## 2024-05-20 RX ADMIN — SODIUM CHLORIDE, POTASSIUM CHLORIDE, SODIUM LACTATE AND CALCIUM CHLORIDE: 600; 310; 30; 20 INJECTION, SOLUTION INTRAVENOUS at 07:28

## 2024-05-20 RX ADMIN — ACETAMINOPHEN 1000 MG: 500 TABLET ORAL at 06:13

## 2024-05-20 NOTE — ANESTHESIA PREPROCEDURE EVALUATION
Anesthesia Evaluation     Patient summary reviewed and Nursing notes reviewed   NPO Solid Status: > 8 hours  NPO Liquid Status: > 2 hours           Airway   Mallampati: II  TM distance: >3 FB  Neck ROM: full  No difficulty expected and Large neck circumference  Dental - normal exam     Pulmonary - negative pulmonary ROS and normal exam   Cardiovascular - negative cardio ROS and normal exam  Exercise tolerance: good (4-7 METS)        Neuro/Psych- negative ROS  GI/Hepatic/Renal/Endo - negative ROS     Musculoskeletal (-) negative ROS    Abdominal    Substance History - negative use     OB/GYN          Other                      Anesthesia Plan    ASA 2     general     intravenous induction     Anesthetic plan, risks, benefits, and alternatives have been provided, discussed and informed consent has been obtained with: patient.    CODE STATUS:

## 2024-05-20 NOTE — ANESTHESIA PROCEDURE NOTES
Airway  Urgency: elective    Date/Time: 5/20/2024 7:42 AM  Airway not difficult    General Information and Staff    Patient location during procedure: OR    Indications and Patient Condition  Indications for airway management: airway protection    Preoxygenated: yes  Mask difficulty assessment: 1 - vent by mask    Final Airway Details  Final airway type: endotracheal airway      Successful airway: ETT  Cuffed: yes   Successful intubation technique: direct laryngoscopy  Facilitating devices/methods: intubating stylet and cricoid pressure  Endotracheal tube insertion site: oral  Blade: Fred  Blade size: 4  ETT size (mm): 8.0  Cormack-Lehane Classification: grade IIb - view of arytenoids or posterior of glottis only  Placement verified by: chest auscultation and capnometry   Measured from: lips  ETT/EBT  to lips (cm): 22  Number of attempts at approach: 1  Assessment: lips, teeth, and gum same as pre-op and atraumatic intubation    Additional Comments  Placed LMA 5 but poor seal. Swapped to 8.0 ETT as above.

## 2024-05-20 NOTE — OP NOTE
CHEST WALL MASS EXCISION  Procedure Report    Patient Name:  Syed Chowdhury  YOB: 1995    Date of Surgery:  5/20/2024     Indications: Anterior chest wall mass causing discomfort.    Pre-op Diagnosis:   Chest wall mass [R22.2]       Post-Op Diagnosis Codes:     * Chest wall mass [R22.2]    Procedure/CPT® Codes:      Procedure(s):  CHEST WALL MASS EXCISION    Staff:  Surgeon(s):  Dallin Morocho MD PhD    Assistant: Summer Norris RNFA was responsible for performing the following activities: Retraction, Suction, Irrigation, Suturing, Closing, and Placing Dressing and their skilled assistance was necessary for the success of this case.     Anesthesia: General    Estimated Blood Loss: minimal    Implants:    Nothing was implanted during the procedure    Specimen:          Specimens       ID Source Type Tests Collected By Collected At Frozen?    A Chest, Middle Tissue TISSUE PATHOLOGY EXAM   Dallin Morocho MD PhD 5/20/24 0748 No    Description: CHEST WALL MASS              Findings: Consistent with sebaceous cyst    Complications: None  .[facility] [place] [facility][[id number]    Description of Procedure: After informed consent was obtained Mr. Chowdhury was brought to the operating suite where timeout was performed correctly identifying patient, procedure, positioning and any special instrumentation.  Next general anesthesia was induced and a single-lumen endotracheal tube was placed.  He was placed in a supine position, he was prepped and draped in the usual sterile fashion.  Approximately 1 inch incision was made directly on top of the mobile mass.  I circumferentially dissected in and around the mobile mass.  There was a small amount of cottage cheese/sebum evacuated from this area.  The capsule which the mass was a part of was completely excised.  Once this entire area was circumferentially excised.  The remaining and surrounding tissue was copiously irrigated.  The specimen was then passed off for permanent  pathology.  This incision was then closed in multiple layers first with a 2-0 Vicryl finally with a 4-0 Monocryl in the skin and skin glue for dressing.  He was woken from anesthesia and transferred to the recovery unit in stable condition.      Dallin Morocho MD PhD     Date: 5/20/2024  Time: 08:28 EDT

## 2024-05-20 NOTE — ANESTHESIA POSTPROCEDURE EVALUATION
Patient: Syed Chowdhury    Procedure Summary       Date: 05/20/24 Room / Location: [facility] OR [address] [place] [address] MAIN OR    Anesthesia Start: 0723 Anesthesia Stop: 0811    Procedure: CHEST WALL MASS EXCISION (Chest) Diagnosis:       Chest wall mass      (Chest wall mass [R22.2])    Surgeons: Dallin Morocho MD PhD Provider: Leonel Christianson MD    Anesthesia Type: general ASA Status: 2            Anesthesia Type: general    Vitals  Vitals Value Taken Time   /95 05/20/24 0845   Temp 36.7 °C (98 °F) 05/20/24 0845   Pulse 83 05/20/24 0847   Resp 18 05/20/24 0845   SpO2 96 % 05/20/24 0847   Vitals shown include unfiled device data.        Post Anesthesia Care and Evaluation    Patient location during evaluation: PACU  Level of consciousness: awake  Pain management: adequate    Airway patency: patent  Anesthetic complications: No anesthetic complications  PONV Status: controlled  Cardiovascular status: acceptable  Respiratory status: acceptable  Hydration status: acceptable    Comments: /98   Pulse 70   Temp 36.7 °C (98 °F) (Oral)   Resp 16   SpO2 97%

## 2024-05-21 LAB
LAB AP CASE REPORT: NORMAL
PATH REPORT.FINAL DX SPEC: NORMAL
PATH REPORT.GROSS SPEC: NORMAL

## 2024-05-24 NOTE — PROGRESS NOTES
Enter Query Response Below      Query Response: unable to determine based on intra operative findings.              If applicable, please update the problem list.

## (undated) DEVICE — ANTIBACTERIAL UNDYED BRAIDED (POLYGLACTIN 910), SYNTHETIC ABSORBABLE SUTURE: Brand: COATED VICRYL

## (undated) DEVICE — SUT VIC 0 CT 36IN J958H

## (undated) DEVICE — GLV SURG BIOGEL LTX PF 7

## (undated) DEVICE — ELECTRD BLD EZ CLN MOD XLNG 2.75IN

## (undated) DEVICE — UNDERGLV SURG BIOGEL INDICATOR LF PF 7.5

## (undated) DEVICE — PK CHST BRST 40

## (undated) DEVICE — SUT MNCRYL PLS ANTIB UD 4/0 PS2 18IN

## (undated) DEVICE — APPL CHLORAPREP HI/LITE 26ML ORNG

## (undated) DEVICE — 3M™ IOBAN™ 2 ANTIMICROBIAL INCISE DRAPE 6650EZ: Brand: IOBAN™ 2

## (undated) DEVICE — DRSNG TELFA PAD NONADH STR 1S 3X4IN

## (undated) DEVICE — UNDYED BRAIDED (POLYGLACTIN 910), SYNTHETIC ABSORBABLE SUTURE: Brand: COATED VICRYL

## (undated) DEVICE — DBD-DRAPE,LAP,CHOLE,W/TROUGHS,STERILE: Brand: MEDLINE

## (undated) DEVICE — PENCL ES MEGADINE EZ/CLEAN BUTN W/HOLSTR 10FT